# Patient Record
Sex: FEMALE | Race: WHITE | NOT HISPANIC OR LATINO | Employment: FULL TIME | RURAL
[De-identification: names, ages, dates, MRNs, and addresses within clinical notes are randomized per-mention and may not be internally consistent; named-entity substitution may affect disease eponyms.]

---

## 2021-02-05 ENCOUNTER — HISTORICAL (OUTPATIENT)
Dept: ADMINISTRATIVE | Facility: HOSPITAL | Age: 36
End: 2021-02-05

## 2021-02-25 ENCOUNTER — HISTORICAL (OUTPATIENT)
Dept: ADMINISTRATIVE | Facility: HOSPITAL | Age: 36
End: 2021-02-25

## 2021-04-28 ENCOUNTER — HISTORICAL (OUTPATIENT)
Dept: ADMINISTRATIVE | Facility: HOSPITAL | Age: 36
End: 2021-04-28

## 2021-10-11 ENCOUNTER — OFFICE VISIT (OUTPATIENT)
Dept: FAMILY MEDICINE | Facility: CLINIC | Age: 36
End: 2021-10-11
Payer: COMMERCIAL

## 2021-10-11 VITALS
WEIGHT: 170 LBS | HEART RATE: 83 BPM | HEIGHT: 64 IN | SYSTOLIC BLOOD PRESSURE: 108 MMHG | RESPIRATION RATE: 16 BRPM | DIASTOLIC BLOOD PRESSURE: 67 MMHG | BODY MASS INDEX: 29.02 KG/M2 | TEMPERATURE: 98 F | OXYGEN SATURATION: 97 %

## 2021-10-11 DIAGNOSIS — Z20.822 CONTACT WITH AND (SUSPECTED) EXPOSURE TO COVID-19: ICD-10-CM

## 2021-10-11 DIAGNOSIS — R05.9 COUGH: ICD-10-CM

## 2021-10-11 DIAGNOSIS — J01.90 ACUTE NON-RECURRENT SINUSITIS, UNSPECIFIED LOCATION: Primary | ICD-10-CM

## 2021-10-11 LAB
CTP QC/QA: YES
FLUAV AG NPH QL: NEGATIVE
FLUBV AG NPH QL: NEGATIVE
SARS-COV-2 AG RESP QL IA.RAPID: NEGATIVE

## 2021-10-11 PROCEDURE — 99213 PR OFFICE/OUTPT VISIT, EST, LEVL III, 20-29 MIN: ICD-10-PCS | Mod: ,,, | Performed by: FAMILY MEDICINE

## 2021-10-11 PROCEDURE — 87428 POCT SARS-COV2 (COVID) WITH FLU ANTIGEN: ICD-10-PCS | Mod: QW,,, | Performed by: FAMILY MEDICINE

## 2021-10-11 PROCEDURE — 99213 OFFICE O/P EST LOW 20 MIN: CPT | Mod: ,,, | Performed by: FAMILY MEDICINE

## 2021-10-11 PROCEDURE — 87428 SARSCOV & INF VIR A&B AG IA: CPT | Mod: QW,,, | Performed by: FAMILY MEDICINE

## 2021-10-11 RX ORDER — AZITHROMYCIN 250 MG/1
TABLET, FILM COATED ORAL
Qty: 6 TABLET | Refills: 0 | Status: SHIPPED | OUTPATIENT
Start: 2021-10-11 | End: 2021-11-28

## 2021-11-22 ENCOUNTER — OFFICE VISIT (OUTPATIENT)
Dept: FAMILY MEDICINE | Facility: CLINIC | Age: 36
End: 2021-11-22
Payer: COMMERCIAL

## 2021-11-22 VITALS
TEMPERATURE: 99 F | BODY MASS INDEX: 41.11 KG/M2 | HEART RATE: 67 BPM | HEIGHT: 64 IN | RESPIRATION RATE: 18 BRPM | OXYGEN SATURATION: 100 % | DIASTOLIC BLOOD PRESSURE: 87 MMHG | WEIGHT: 240.81 LBS | SYSTOLIC BLOOD PRESSURE: 131 MMHG

## 2021-11-22 DIAGNOSIS — K21.00 GASTROESOPHAGEAL REFLUX DISEASE WITH ESOPHAGITIS WITHOUT HEMORRHAGE: Primary | Chronic | ICD-10-CM

## 2021-11-22 PROCEDURE — 99213 PR OFFICE/OUTPT VISIT, EST, LEVL III, 20-29 MIN: ICD-10-PCS | Mod: ,,, | Performed by: FAMILY MEDICINE

## 2021-11-22 PROCEDURE — 99213 OFFICE O/P EST LOW 20 MIN: CPT | Mod: ,,, | Performed by: FAMILY MEDICINE

## 2021-11-22 RX ORDER — PANTOPRAZOLE SODIUM 40 MG/1
40 TABLET, DELAYED RELEASE ORAL 2 TIMES DAILY
Qty: 60 TABLET | Refills: 2 | Status: SHIPPED | OUTPATIENT
Start: 2021-11-22 | End: 2021-11-23 | Stop reason: DRUGHIGH

## 2021-11-23 ENCOUNTER — PATIENT MESSAGE (OUTPATIENT)
Dept: FAMILY MEDICINE | Facility: CLINIC | Age: 36
End: 2021-11-23
Payer: COMMERCIAL

## 2021-11-23 RX ORDER — PANTOPRAZOLE SODIUM 40 MG/1
40 TABLET, DELAYED RELEASE ORAL DAILY
Qty: 30 TABLET | Refills: 2 | Status: SHIPPED | OUTPATIENT
Start: 2021-11-23 | End: 2022-11-23

## 2021-11-28 PROBLEM — K21.00 GASTROESOPHAGEAL REFLUX DISEASE WITH ESOPHAGITIS WITHOUT HEMORRHAGE: Chronic | Status: ACTIVE | Noted: 2021-11-28

## 2022-04-01 ENCOUNTER — OFFICE VISIT (OUTPATIENT)
Dept: FAMILY MEDICINE | Facility: CLINIC | Age: 37
End: 2022-04-01
Payer: COMMERCIAL

## 2022-04-01 ENCOUNTER — HOSPITAL ENCOUNTER (OUTPATIENT)
Dept: RADIOLOGY | Facility: HOSPITAL | Age: 37
Discharge: HOME | End: 2022-04-01
Attending: FAMILY MEDICINE
Payer: COMMERCIAL

## 2022-04-01 VITALS
OXYGEN SATURATION: 99 % | SYSTOLIC BLOOD PRESSURE: 113 MMHG | RESPIRATION RATE: 16 BRPM | BODY MASS INDEX: 41.83 KG/M2 | HEART RATE: 76 BPM | DIASTOLIC BLOOD PRESSURE: 79 MMHG | WEIGHT: 245 LBS | TEMPERATURE: 99 F | HEIGHT: 64 IN

## 2022-04-01 DIAGNOSIS — M79.671 RIGHT FOOT PAIN: ICD-10-CM

## 2022-04-01 DIAGNOSIS — M79.671 RIGHT FOOT PAIN: Primary | ICD-10-CM

## 2022-04-01 PROCEDURE — 99213 OFFICE O/P EST LOW 20 MIN: CPT | Mod: ,,, | Performed by: FAMILY MEDICINE

## 2022-04-01 PROCEDURE — 3074F SYST BP LT 130 MM HG: CPT | Mod: CPTII,,, | Performed by: FAMILY MEDICINE

## 2022-04-01 PROCEDURE — 3078F PR MOST RECENT DIASTOLIC BLOOD PRESSURE < 80 MM HG: ICD-10-PCS | Mod: CPTII,,, | Performed by: FAMILY MEDICINE

## 2022-04-01 PROCEDURE — 1159F MED LIST DOCD IN RCRD: CPT | Mod: CPTII,,, | Performed by: FAMILY MEDICINE

## 2022-04-01 PROCEDURE — 99051 MED SERV EVE/WKEND/HOLIDAY: CPT | Mod: ,,, | Performed by: FAMILY MEDICINE

## 2022-04-01 PROCEDURE — 1159F PR MEDICATION LIST DOCUMENTED IN MEDICAL RECORD: ICD-10-PCS | Mod: CPTII,,, | Performed by: FAMILY MEDICINE

## 2022-04-01 PROCEDURE — 3078F DIAST BP <80 MM HG: CPT | Mod: CPTII,,, | Performed by: FAMILY MEDICINE

## 2022-04-01 PROCEDURE — 3008F PR BODY MASS INDEX (BMI) DOCUMENTED: ICD-10-PCS | Mod: CPTII,,, | Performed by: FAMILY MEDICINE

## 2022-04-01 PROCEDURE — 1160F RVW MEDS BY RX/DR IN RCRD: CPT | Mod: CPTII,,, | Performed by: FAMILY MEDICINE

## 2022-04-01 PROCEDURE — 99213 PR OFFICE/OUTPT VISIT, EST, LEVL III, 20-29 MIN: ICD-10-PCS | Mod: ,,, | Performed by: FAMILY MEDICINE

## 2022-04-01 PROCEDURE — 99051 PR MEDICAL SERVICES, EVE/WKEND/HOLIDAY: ICD-10-PCS | Mod: ,,, | Performed by: FAMILY MEDICINE

## 2022-04-01 PROCEDURE — 1160F PR REVIEW ALL MEDS BY PRESCRIBER/CLIN PHARMACIST DOCUMENTED: ICD-10-PCS | Mod: CPTII,,, | Performed by: FAMILY MEDICINE

## 2022-04-01 PROCEDURE — 3074F PR MOST RECENT SYSTOLIC BLOOD PRESSURE < 130 MM HG: ICD-10-PCS | Mod: CPTII,,, | Performed by: FAMILY MEDICINE

## 2022-04-01 PROCEDURE — 3008F BODY MASS INDEX DOCD: CPT | Mod: CPTII,,, | Performed by: FAMILY MEDICINE

## 2022-04-01 PROCEDURE — 73630 X-RAY EXAM OF FOOT: CPT | Mod: TC,RT

## 2022-04-01 RX ORDER — MELOXICAM 7.5 MG/1
7.5 TABLET ORAL DAILY
Qty: 30 TABLET | Refills: 1 | Status: SHIPPED | OUTPATIENT
Start: 2022-04-01 | End: 2024-02-14

## 2022-04-01 RX ORDER — DICLOFENAC SODIUM 10 MG/G
GEL TOPICAL
Qty: 200 G | Refills: 5 | Status: SHIPPED | OUTPATIENT
Start: 2022-04-01 | End: 2022-09-25

## 2022-04-01 NOTE — PROGRESS NOTES
"   Rajeev Valenzuela MD    [address] [place] [address]     PATIENT NAME: Chloe Julien  : 1985  DATE: 22  MRN: [id number]      Billing Provider: Rajeev Valenzuela MD  Level of Service: IA OFFICE/OUTPT VISIT, EST, LEVL III, 20-29 MIN  Patient PCP Information     Provider PCP Type    Edita Ellis MD General          Reason for Visit / Chief Complaint: Foot Pain (Presents with right foot pain for 2 weeks. Reports that the pain is on the anterior and medial side of her foot. Admits that she was dancing when the pain started and she "felt something pop.")       Update PCP  Update Chief Complaint         History of Present Illness / Problem Focused Workflow     Chloe Julien presents to the clinic with Foot Pain (Presents with right foot pain for 2 weeks. Reports that the pain is on the anterior and medial side of her foot. Admits that she was dancing when the pain started and she "felt something pop.")     HPI    Review of Systems     Review of Systems   Constitutional: Positive for activity change. Negative for unexpected weight change.   HENT: Negative for hearing loss, rhinorrhea and trouble swallowing.    Eyes: Negative for discharge and visual disturbance.   Respiratory: Negative for chest tightness and wheezing.    Cardiovascular: Negative for chest pain and palpitations.   Gastrointestinal: Negative for blood in stool, constipation, diarrhea and vomiting.   Endocrine: Negative for polydipsia and polyuria.   Genitourinary: Negative for difficulty urinating, dysuria, hematuria and menstrual problem.   Musculoskeletal: Negative for arthralgias, joint swelling and neck pain.   Neurological: Negative for weakness and headaches.   Psychiatric/Behavioral: Negative for confusion and dysphoric mood.        Medical / Social / Family History     Past Medical History:   Diagnosis Date    Cervical cancer     Emphysema of lung     IBS (irritable bowel " syndrome)        Past Surgical History:   Procedure Laterality Date    ADENOIDECTOMY      HYSTERECTOMY      SPINE SURGERY      TONSILLECTOMY         Social History  Ms.  reports that she has been smoking. She has never used smokeless tobacco. She reports previous alcohol use.    Family History  Ms.'s family history includes Cancer in her maternal grandfather, maternal grandmother, mother, paternal grandfather, and paternal grandmother; Heart disease in her father.    Medications and Allergies     Medications  Outpatient Medications Marked as Taking for the 4/1/22 encounter (Office Visit) with Rajeev Valenzuela MD   Medication Sig Dispense Refill    pantoprazole (PROTONIX) 40 MG tablet Take 1 tablet (40 mg total) by mouth once daily. 30 tablet 2       Allergies  Review of patient's allergies indicates:   Allergen Reactions    Opioids - morphine analogues        Physical Examination     Vitals:    04/01/22 1720   BP: 113/79   Pulse: 76   Resp: 16   Temp: 98.6 °F (37 °C)     Physical Exam  Vitals and nursing note reviewed.   Constitutional:       General: She is not in acute distress.     Appearance: Normal appearance. She is not ill-appearing.   Eyes:      Extraocular Movements: Extraocular movements intact.      Pupils: Pupils are equal, round, and reactive to light.   Cardiovascular:      Rate and Rhythm: Normal rate and regular rhythm.      Heart sounds: Normal heart sounds.   Pulmonary:      Effort: Pulmonary effort is normal.      Breath sounds: Normal breath sounds.   Abdominal:      General: Bowel sounds are normal.      Palpations: Abdomen is soft.   Musculoskeletal:         General: Normal range of motion.        Feet:    Feet:      Comments: Tenderness with flexion and extension of the great toe  Skin:     Findings: No rash.   Neurological:      General: No focal deficit present.      Mental Status: She is alert and oriented to person, place, and time. Mental status is at baseline.   Psychiatric:          Mood and Affect: Mood normal.         Behavior: Behavior normal.          Assessment and Plan (including Health Maintenance)      Problem List  [employer]  Document Outside HM   :    Plan:         Health Maintenance Due   Topic Date Due    Hepatitis C Screening  Never done    Lipid Panel  Never done    HIV Screening  Never done       Problem List Items Addressed This Visit        Orthopedic    Right foot pain - Primary    Relevant Orders    X-Ray Foot Complete 3 view Right        Right foot pain  -     X-Ray Foot Complete 3 view Right; Future; Expected date: 04/01/2022       Health Maintenance Topics with due status: Not Due       Topic Last Completion Date    TETANUS VACCINE 07/16/2014       Procedures     No future appointments.     No follow-ups on file.     Signature:  Rajeev Valenzuela MD  [address] [place] [address]  Phone #: [phone]  Fax #: [phone]    Date of encounter: 4/1/22    There are no Patient Instructions on file for this visit.

## 2022-07-18 ENCOUNTER — OFFICE VISIT (OUTPATIENT)
Dept: FAMILY MEDICINE | Facility: CLINIC | Age: 37
End: 2022-07-18
Payer: COMMERCIAL

## 2022-07-18 VITALS
SYSTOLIC BLOOD PRESSURE: 128 MMHG | HEART RATE: 76 BPM | HEIGHT: 64 IN | BODY MASS INDEX: 37.05 KG/M2 | OXYGEN SATURATION: 98 % | RESPIRATION RATE: 18 BRPM | TEMPERATURE: 98 F | DIASTOLIC BLOOD PRESSURE: 89 MMHG | WEIGHT: 217 LBS

## 2022-07-18 DIAGNOSIS — N30.01 ACUTE CYSTITIS WITH HEMATURIA: Primary | ICD-10-CM

## 2022-07-18 DIAGNOSIS — R30.0 DYSURIA: ICD-10-CM

## 2022-07-18 LAB
BILIRUB SERPL-MCNC: ABNORMAL MG/DL
BLOOD URINE, POC: ABNORMAL
COLOR, POC UA: YELLOW
GLUCOSE UR QL STRIP: ABNORMAL
KETONES UR QL STRIP: ABNORMAL
LEUKOCYTE ESTERASE URINE, POC: ABNORMAL
NITRITE, POC UA: ABNORMAL
PH, POC UA: 7
PROTEIN, POC: ABNORMAL
SPECIFIC GRAVITY, POC UA: 1.02
UROBILINOGEN, POC UA: 1

## 2022-07-18 PROCEDURE — 99213 OFFICE O/P EST LOW 20 MIN: CPT | Mod: 25,,, | Performed by: FAMILY MEDICINE

## 2022-07-18 PROCEDURE — 96372 PR INJECTION,THERAP/PROPH/DIAG2ST, IM OR SUBCUT: ICD-10-PCS | Mod: ,,, | Performed by: FAMILY MEDICINE

## 2022-07-18 PROCEDURE — 3074F PR MOST RECENT SYSTOLIC BLOOD PRESSURE < 130 MM HG: ICD-10-PCS | Mod: CPTII,,, | Performed by: FAMILY MEDICINE

## 2022-07-18 PROCEDURE — 81003 URINALYSIS AUTO W/O SCOPE: CPT | Mod: QW,,, | Performed by: FAMILY MEDICINE

## 2022-07-18 PROCEDURE — 3044F PR MOST RECENT HEMOGLOBIN A1C LEVEL <7.0%: ICD-10-PCS | Mod: CPTII,,, | Performed by: FAMILY MEDICINE

## 2022-07-18 PROCEDURE — 3079F PR MOST RECENT DIASTOLIC BLOOD PRESSURE 80-89 MM HG: ICD-10-PCS | Mod: CPTII,,, | Performed by: FAMILY MEDICINE

## 2022-07-18 PROCEDURE — 3008F BODY MASS INDEX DOCD: CPT | Mod: CPTII,,, | Performed by: FAMILY MEDICINE

## 2022-07-18 PROCEDURE — 3079F DIAST BP 80-89 MM HG: CPT | Mod: CPTII,,, | Performed by: FAMILY MEDICINE

## 2022-07-18 PROCEDURE — 1159F PR MEDICATION LIST DOCUMENTED IN MEDICAL RECORD: ICD-10-PCS | Mod: CPTII,,, | Performed by: FAMILY MEDICINE

## 2022-07-18 PROCEDURE — 81003 POCT URINALYSIS W/O SCOPE: ICD-10-PCS | Mod: QW,,, | Performed by: FAMILY MEDICINE

## 2022-07-18 PROCEDURE — 3008F PR BODY MASS INDEX (BMI) DOCUMENTED: ICD-10-PCS | Mod: CPTII,,, | Performed by: FAMILY MEDICINE

## 2022-07-18 PROCEDURE — 3074F SYST BP LT 130 MM HG: CPT | Mod: CPTII,,, | Performed by: FAMILY MEDICINE

## 2022-07-18 PROCEDURE — 1160F RVW MEDS BY RX/DR IN RCRD: CPT | Mod: CPTII,,, | Performed by: FAMILY MEDICINE

## 2022-07-18 PROCEDURE — 1159F MED LIST DOCD IN RCRD: CPT | Mod: CPTII,,, | Performed by: FAMILY MEDICINE

## 2022-07-18 PROCEDURE — 96372 THER/PROPH/DIAG INJ SC/IM: CPT | Mod: ,,, | Performed by: FAMILY MEDICINE

## 2022-07-18 PROCEDURE — 3044F HG A1C LEVEL LT 7.0%: CPT | Mod: CPTII,,, | Performed by: FAMILY MEDICINE

## 2022-07-18 PROCEDURE — 1160F PR REVIEW ALL MEDS BY PRESCRIBER/CLIN PHARMACIST DOCUMENTED: ICD-10-PCS | Mod: CPTII,,, | Performed by: FAMILY MEDICINE

## 2022-07-18 PROCEDURE — 99213 PR OFFICE/OUTPT VISIT, EST, LEVL III, 20-29 MIN: ICD-10-PCS | Mod: 25,,, | Performed by: FAMILY MEDICINE

## 2022-07-18 RX ORDER — ERGOCALCIFEROL 1.25 MG/1
50000 CAPSULE ORAL
COMMUNITY
Start: 2022-07-06 | End: 2023-10-02

## 2022-07-18 RX ORDER — CEFTRIAXONE 1 G/1
1 INJECTION, POWDER, FOR SOLUTION INTRAMUSCULAR; INTRAVENOUS
Status: COMPLETED | OUTPATIENT
Start: 2022-07-18 | End: 2022-07-18

## 2022-07-18 RX ORDER — SEMAGLUTIDE 1.34 MG/ML
0.5 INJECTION, SOLUTION SUBCUTANEOUS
COMMUNITY
Start: 2022-07-06 | End: 2022-09-25

## 2022-07-18 RX ORDER — CIPROFLOXACIN 500 MG/1
500 TABLET ORAL EVERY 12 HOURS
Qty: 14 TABLET | Refills: 0 | Status: SHIPPED | OUTPATIENT
Start: 2022-07-18 | End: 2022-07-25

## 2022-07-18 RX ADMIN — CEFTRIAXONE 1 G: 1 INJECTION, POWDER, FOR SOLUTION INTRAMUSCULAR; INTRAVENOUS at 04:07

## 2022-07-18 NOTE — PROGRESS NOTES
Subjective:       Patient ID: Chloe Julien is a 37 y.o. female.    Chief Complaint: Urinary Tract Infection (Dysuria, lower back pain, lower abdominal discomfort started Saturday.)    HPI  Review of Systems   Constitutional: Negative for activity change, appetite change, chills, diaphoresis, fatigue, fever and unexpected weight change.   HENT: Negative for nasal congestion, dental problem, drooling, ear discharge, ear pain, facial swelling, hearing loss, mouth sores, nosebleeds, postnasal drip, rhinorrhea, sinus pressure/congestion, sneezing, sore throat, tinnitus, trouble swallowing, voice change and goiter.    Eyes: Negative for photophobia, discharge, itching and visual disturbance.   Respiratory: Negative for apnea, cough, choking, chest tightness, shortness of breath, wheezing and stridor.    Cardiovascular: Negative for chest pain, palpitations, leg swelling and claudication.   Gastrointestinal: Negative for abdominal distention, abdominal pain, anal bleeding, blood in stool, change in bowel habit, constipation, diarrhea, nausea, vomiting and change in bowel habit.   Endocrine: Negative for cold intolerance, heat intolerance, polydipsia, polyphagia and polyuria.   Genitourinary: Positive for dysuria and frequency. Negative for bladder incontinence, decreased urine volume, difficulty urinating, enuresis, flank pain, hematuria, nocturia, pelvic pain and urgency.   Musculoskeletal: Negative for arthralgias, back pain, gait problem, joint swelling, leg pain, myalgias, neck pain, neck stiffness and joint deformity.   Integumentary:  Negative for pallor, rash, wound, breast mass and breast tenderness.   Allergic/Immunologic: Negative for environmental allergies, food allergies and immunocompromised state.   Neurological: Negative for dizziness, vertigo, tremors, seizures, syncope, facial asymmetry, speech difficulty, weakness, light-headedness, numbness, headaches, disturbances in coordination, memory loss and  coordination difficulties.   Hematological: Negative for adenopathy. Does not bruise/bleed easily.   Psychiatric/Behavioral: Negative for agitation, behavioral problems, confusion, decreased concentration, dysphoric mood, hallucinations, self-injury, sleep disturbance and suicidal ideas. The patient is not nervous/anxious and is not hyperactive.    Breast: Negative for mass and tenderness        Objective:      Physical Exam  Vitals reviewed.   Constitutional:       Appearance: Normal appearance.   HENT:      Head: Normocephalic and atraumatic.      Right Ear: Tympanic membrane, ear canal and external ear normal.      Left Ear: Tympanic membrane, ear canal and external ear normal.      Nose: Nose normal.      Mouth/Throat:      Mouth: Mucous membranes are moist.      Pharynx: Oropharynx is clear.   Eyes:      Extraocular Movements: Extraocular movements intact.      Conjunctiva/sclera: Conjunctivae normal.      Pupils: Pupils are equal, round, and reactive to light.   Cardiovascular:      Rate and Rhythm: Normal rate and regular rhythm.      Pulses: Normal pulses.      Heart sounds: Normal heart sounds.   Pulmonary:      Effort: Pulmonary effort is normal.      Breath sounds: Normal breath sounds.   Abdominal:      General: Bowel sounds are normal.      Palpations: Abdomen is soft.   Musculoskeletal:         General: Normal range of motion.      Cervical back: Normal range of motion and neck supple.   Skin:     General: Skin is warm and dry.   Neurological:      General: No focal deficit present.      Mental Status: She is alert. Mental status is at baseline.   Psychiatric:         Mood and Affect: Mood normal.         Behavior: Behavior normal.         Thought Content: Thought content normal.         Judgment: Judgment normal.         Assessment:       1. Acute cystitis with hematuria    2. Dysuria        Plan:     Acute cystitis with hematuria  -     cefTRIAXone injection 1 g  -     ciprofloxacin HCl (CIPRO) 500  MG tablet; Take 1 tablet (500 mg total) by mouth every 12 (twelve) hours. for 7 days  Dispense: 14 tablet; Refill: 0    Dysuria  -     POCT URINALYSIS W/O SCOPE

## 2022-07-18 NOTE — LETTER
July 18, 2022      [facility]  [address] [address]  Phone: [phone]  Fax: [phone]       Patient: Chloe Julien   YOB: 1985  Date of Visit: 07/18/2022    To Whom It May Concern:    Jose R Julien  was at [facility] on 07/18/2022. The patient may return to work on 07/19/2022 with no restrictions. If you have any questions or concerns, or if I can be of further assistance, please do not hesitate to contact me.    Sincerely,    Shade Escobedo LPN

## 2022-09-13 ENCOUNTER — OFFICE VISIT (OUTPATIENT)
Dept: FAMILY MEDICINE | Facility: CLINIC | Age: 37
End: 2022-09-13
Payer: COMMERCIAL

## 2022-09-13 ENCOUNTER — PATIENT MESSAGE (OUTPATIENT)
Dept: FAMILY MEDICINE | Facility: CLINIC | Age: 37
End: 2022-09-13
Payer: COMMERCIAL

## 2022-09-13 VITALS
TEMPERATURE: 98 F | RESPIRATION RATE: 20 BRPM | BODY MASS INDEX: 34.83 KG/M2 | WEIGHT: 204 LBS | SYSTOLIC BLOOD PRESSURE: 120 MMHG | HEIGHT: 64 IN | DIASTOLIC BLOOD PRESSURE: 85 MMHG | OXYGEN SATURATION: 98 % | HEART RATE: 78 BPM

## 2022-09-13 DIAGNOSIS — L03.221 CELLULITIS AND ABSCESS OF NECK: Primary | ICD-10-CM

## 2022-09-13 DIAGNOSIS — L02.11 CELLULITIS AND ABSCESS OF NECK: Primary | ICD-10-CM

## 2022-09-13 PROCEDURE — 1159F MED LIST DOCD IN RCRD: CPT | Mod: ,,, | Performed by: FAMILY MEDICINE

## 2022-09-13 PROCEDURE — 3079F PR MOST RECENT DIASTOLIC BLOOD PRESSURE 80-89 MM HG: ICD-10-PCS | Mod: ,,, | Performed by: FAMILY MEDICINE

## 2022-09-13 PROCEDURE — 87070 CULTURE, WOUND: ICD-10-PCS | Mod: ,,, | Performed by: CLINICAL MEDICAL LABORATORY

## 2022-09-13 PROCEDURE — 3008F PR BODY MASS INDEX (BMI) DOCUMENTED: ICD-10-PCS | Mod: ,,, | Performed by: FAMILY MEDICINE

## 2022-09-13 PROCEDURE — 1160F RVW MEDS BY RX/DR IN RCRD: CPT | Mod: ,,, | Performed by: FAMILY MEDICINE

## 2022-09-13 PROCEDURE — 3044F PR MOST RECENT HEMOGLOBIN A1C LEVEL <7.0%: ICD-10-PCS | Mod: ,,, | Performed by: FAMILY MEDICINE

## 2022-09-13 PROCEDURE — 1159F PR MEDICATION LIST DOCUMENTED IN MEDICAL RECORD: ICD-10-PCS | Mod: ,,, | Performed by: FAMILY MEDICINE

## 2022-09-13 PROCEDURE — 99213 PR OFFICE/OUTPT VISIT, EST, LEVL III, 20-29 MIN: ICD-10-PCS | Mod: 25,,, | Performed by: FAMILY MEDICINE

## 2022-09-13 PROCEDURE — 87070 CULTURE OTHR SPECIMN AEROBIC: CPT | Mod: ,,, | Performed by: CLINICAL MEDICAL LABORATORY

## 2022-09-13 PROCEDURE — 3044F HG A1C LEVEL LT 7.0%: CPT | Mod: ,,, | Performed by: FAMILY MEDICINE

## 2022-09-13 PROCEDURE — 3079F DIAST BP 80-89 MM HG: CPT | Mod: ,,, | Performed by: FAMILY MEDICINE

## 2022-09-13 PROCEDURE — 99213 OFFICE O/P EST LOW 20 MIN: CPT | Mod: 25,,, | Performed by: FAMILY MEDICINE

## 2022-09-13 PROCEDURE — 3074F PR MOST RECENT SYSTOLIC BLOOD PRESSURE < 130 MM HG: ICD-10-PCS | Mod: ,,, | Performed by: FAMILY MEDICINE

## 2022-09-13 PROCEDURE — 10061 I&D ABSCESS COMP/MULTIPLE: CPT | Mod: ,,, | Performed by: FAMILY MEDICINE

## 2022-09-13 PROCEDURE — 10061 PR DRAIN SKIN ABSCESS COMPLIC: ICD-10-PCS | Mod: ,,, | Performed by: FAMILY MEDICINE

## 2022-09-13 PROCEDURE — 3008F BODY MASS INDEX DOCD: CPT | Mod: ,,, | Performed by: FAMILY MEDICINE

## 2022-09-13 PROCEDURE — 3074F SYST BP LT 130 MM HG: CPT | Mod: ,,, | Performed by: FAMILY MEDICINE

## 2022-09-13 PROCEDURE — 1160F PR REVIEW ALL MEDS BY PRESCRIBER/CLIN PHARMACIST DOCUMENTED: ICD-10-PCS | Mod: ,,, | Performed by: FAMILY MEDICINE

## 2022-09-13 NOTE — LETTER
September 13, 2022    Chloe Julien  [address] [place] [address]         [facility] [address] [place] [address]  Phone: [phone]  Fax: [phone] September 13, 2022     Patient: Chloe Julien   YOB: 1985   Date of Visit: 9/13/2022       To Whom It May Concern:    It is my medical opinion that Chloe Julien may return to work on 09/14/2022.    If you have any questions or concerns, please don't hesitate to call.    Sincerely,        Edita Ellis MD

## 2022-09-13 NOTE — PROGRESS NOTES
Edita Ellis MD    [address] [place] [address]     PATIENT NAME: Chloe Julien  : 1985  DATE: 22  MRN: [id number]      Billing Provider: Edita Ellis MD  Level of Service:   Patient PCP Information       Provider PCP Type    Edita Ellis MD General            Reason for Visit / Chief Complaint: Pain (C/o painful  red area next to left ear)       Update PCP  Update Chief Complaint         History of Present Illness / Problem Focused Workflow     Chloe Julien presents to the clinic with Pain (C/o painful  red area next to left ear)     Patient complains of an abscess to near left ear for several days. She denies fever or drainage.     Pain  Pertinent negatives include no abdominal pain, arthralgias, change in bowel habit, chest pain, chills, congestion, coughing, fatigue, fever, headaches, joint swelling, myalgias, nausea, neck pain, rash, sore throat, vomiting or weakness.     Review of Systems     Review of Systems   Constitutional:  Negative for activity change, appetite change, chills, fatigue, fever and unexpected weight change.   HENT:  Negative for nasal congestion, ear pain, hearing loss, postnasal drip, rhinorrhea, sore throat and trouble swallowing.    Eyes:  Negative for discharge and visual disturbance.   Respiratory:  Negative for cough, chest tightness, shortness of breath and wheezing.    Cardiovascular:  Negative for chest pain, palpitations, leg swelling and claudication.   Gastrointestinal:  Negative for abdominal pain, blood in stool, change in bowel habit, constipation, diarrhea, nausea, vomiting and change in bowel habit.   Endocrine: Negative for polydipsia and polyuria.   Genitourinary:  Negative for difficulty urinating, dysuria, hematuria and menstrual problem.   Musculoskeletal:  Negative for arthralgias, back pain, gait problem, joint swelling, myalgias and neck pain.   Integumentary:  Negative for rash.    Neurological:  Negative for weakness and headaches.   Psychiatric/Behavioral:  Negative for confusion, dysphoric mood and suicidal ideas. The patient is not nervous/anxious.       Medical / Social / Family History     Past Medical History:   Diagnosis Date    Cervical cancer     Emphysema of lung     IBS (irritable bowel syndrome)        Past Surgical History:   Procedure Laterality Date    ADENOIDECTOMY      HYSTERECTOMY      SPINE SURGERY      TONSILLECTOMY         Social History  Ms.  reports that she quit smoking about 4 weeks ago. Her smoking use included cigarettes. She has never used smokeless tobacco. She reports that she does not currently use alcohol. She reports that she does not use drugs.    Family History  Ms.'s family history includes Cancer in her maternal grandfather, maternal grandmother, mother, paternal grandfather, and paternal grandmother; Heart disease in her father.    Medications and Allergies     Medications  Outpatient Medications Marked as Taking for the 9/13/22 encounter (Office Visit) with Edita Ellis MD   Medication Sig Dispense Refill    ergocalciferol (ERGOCALCIFEROL) 50,000 unit Cap Take 50,000 Units by mouth every 7 days.      meloxicam (MOBIC) 7.5 MG tablet Take 1 tablet (7.5 mg total) by mouth once daily. For PAIN and INFLAMMATION 30 tablet 1    OZEMPIC 0.25 mg or 0.5 mg(2 mg/1.5 mL) pen injector Inject 0.5 mg into the skin every 7 days. Pt takes 1 mg every week      pantoprazole (PROTONIX) 40 MG tablet Take 1 tablet (40 mg total) by mouth once daily. 30 tablet 2       Allergies  Review of patient's allergies indicates:   Allergen Reactions    Opioids - morphine analogues        Physical Examination     Vitals:    09/13/22 1048   BP: 120/85   Pulse: 78   Resp: 20   Temp: 98.1 °F (36.7 °C)     Physical Exam  Vitals and nursing note reviewed.   Constitutional:       General: She is not in acute distress.     Appearance: Normal appearance. She is not ill-appearing.   Eyes:       Pupils: Pupils are equal, round, and reactive to light.   Cardiovascular:      Rate and Rhythm: Normal rate and regular rhythm.      Heart sounds: Normal heart sounds.   Pulmonary:      Effort: Pulmonary effort is normal.      Breath sounds: Normal breath sounds.   Skin:     Findings: Abscess present.      Comments: Patient has 1 cm area of erythema, induration and fluctuation under left ear.    Neurological:      General: No focal deficit present.      Mental Status: She is alert and oriented to person, place, and time. Mental status is at baseline.   Psychiatric:         Mood and Affect: Mood normal.         Behavior: Behavior normal.        Assessment and Plan (including Health Maintenance)      Problem List  [employer]  Document Outside HM   :    Plan:         Health Maintenance Due   Topic Date Due    Hepatitis C Screening  Never done    HIV Screening  Never done    Influenza Vaccine (1) 09/01/2022       Problem List Items Addressed This Visit    None  Visit Diagnoses       Cellulitis and abscess of neck    -  Primary    Relevant Medications    sulfamethoxazole-trimethoprim 800-160mg (BACTRIM DS) 800-160 mg Tab    Other Relevant Orders    Culture, Wound (Completed)          Cellulitis and abscess of neck  -     Culture, Wound  -     sulfamethoxazole-trimethoprim 800-160mg (BACTRIM DS) 800-160 mg Tab; Take 1 tablet by mouth 2 (two) times daily.  Dispense: 20 tablet; Refill: 0    Other orders  -     Incision & Drainage     Health Maintenance Topics with due status: Not Due       Topic Last Completion Date    TETANUS VACCINE 07/16/2014       Incision & Drainage    Date/Time: 9/13/2022 10:30 AM  Performed by: Edita Ellis MD  Authorized by: Edita Ellis MD       Type:  Abscess  Body area:  Head/neck  Anesthesia:  Local infiltration  Local anesthetic: Lidocaine 1% without epinephrine  Scalpel size:  11  Incision type:  Single straight  Complexity:  Simple  Drainage:  Bloody and purulent  Drainage  amount:  Moderate  Wound treatment:  Drainage, incision, expression of material and wound packed  Packing material:  1/4 in iodoform gauze  Patient tolerance:  Patient tolerated the procedure well with no immediate complications     No future appointments.     Follow up if symptoms worsen or fail to improve.     Signature:  Edita Ellis MD  [address] [place] [address]  Phone #: [phone]  Fax #: [phone]    Date of encounter: 9/13/22    There are no Patient Instructions on file for this visit.

## 2022-09-14 RX ORDER — SULFAMETHOXAZOLE AND TRIMETHOPRIM 800; 160 MG/1; MG/1
1 TABLET ORAL 2 TIMES DAILY
Qty: 20 TABLET | Refills: 0 | Status: SHIPPED | OUTPATIENT
Start: 2022-09-14 | End: 2023-01-04

## 2022-09-15 LAB — MICROORGANISM SPEC CULT: NORMAL

## 2023-01-04 ENCOUNTER — OFFICE VISIT (OUTPATIENT)
Dept: FAMILY MEDICINE | Facility: CLINIC | Age: 38
End: 2023-01-04

## 2023-01-04 VITALS
HEART RATE: 87 BPM | HEIGHT: 64 IN | SYSTOLIC BLOOD PRESSURE: 132 MMHG | TEMPERATURE: 100 F | RESPIRATION RATE: 18 BRPM | OXYGEN SATURATION: 99 % | BODY MASS INDEX: 32.44 KG/M2 | WEIGHT: 190 LBS | DIASTOLIC BLOOD PRESSURE: 82 MMHG

## 2023-01-04 DIAGNOSIS — U07.1 COVID-19 VIRUS INFECTION: Primary | ICD-10-CM

## 2023-01-04 DIAGNOSIS — R50.9 FEVER, UNSPECIFIED FEVER CAUSE: ICD-10-CM

## 2023-01-04 PROBLEM — C53.1 MALIGNANT NEOPLASM OF EXOCERVIX: Status: ACTIVE | Noted: 2023-01-04

## 2023-01-04 PROBLEM — M79.671 RIGHT FOOT PAIN: Status: RESOLVED | Noted: 2022-04-01 | Resolved: 2023-01-04

## 2023-01-04 LAB
CTP QC/QA: YES
FLUAV AG NPH QL: NEGATIVE
FLUBV AG NPH QL: NEGATIVE
SARS-COV-2 AG RESP QL IA.RAPID: POSITIVE

## 2023-01-04 PROCEDURE — 99051 PR MEDICAL SERVICES, EVE/WKEND/HOLIDAY: ICD-10-PCS | Mod: ,,, | Performed by: NURSE PRACTITIONER

## 2023-01-04 PROCEDURE — 99051 MED SERV EVE/WKEND/HOLIDAY: CPT | Mod: ,,, | Performed by: NURSE PRACTITIONER

## 2023-01-04 PROCEDURE — 99214 PR OFFICE/OUTPT VISIT, EST, LEVL IV, 30-39 MIN: ICD-10-PCS | Mod: ,,, | Performed by: NURSE PRACTITIONER

## 2023-01-04 PROCEDURE — 99214 OFFICE O/P EST MOD 30 MIN: CPT | Mod: ,,, | Performed by: NURSE PRACTITIONER

## 2023-01-04 PROCEDURE — 87428 SARSCOV & INF VIR A&B AG IA: CPT | Mod: QW,,, | Performed by: NURSE PRACTITIONER

## 2023-01-04 PROCEDURE — 87428 POCT SARS-COV2 (COVID) WITH FLU ANTIGEN: ICD-10-PCS | Mod: QW,,, | Performed by: NURSE PRACTITIONER

## 2023-01-04 RX ORDER — AZITHROMYCIN 250 MG/1
TABLET, FILM COATED ORAL
Qty: 6 TABLET | Refills: 0 | Status: SHIPPED | OUTPATIENT
Start: 2023-01-04 | End: 2023-10-02

## 2023-01-04 NOTE — LETTER
[address] [facility] [address]  Phone: [phone]  Fax: [phone]          Return to Work/School    Patient: Chloe Julien  YOB: 1985   Date: 01/04/2023     To Whom It May Concern:     Chloe Julien was in contact with/seen in my office on 01/04/2023. COVID-19 is present in our communities across the [facility]. There is limited testing for COVID at this time, so not all patients can be tested. In this situation, your employee meets the following criteria:     Chloe Julien has met the criteria for COVID-19 testing and has a POSITIVE result. She can return to work once they are asymptomatic for 24 hours without the use of fever reducing medications AND at least five days from the start of symptoms (or from the first positive result if they have no symptoms).      If you have any questions or concerns, or if I can be of further assistance, please do not hesitate to contact me.     Sincerely,    ELIZA Kate

## 2023-01-05 NOTE — PROGRESS NOTES
[facility]    Chief Complaint      Chief Complaint   Patient presents with    Fever     States ibuprofen one and half hour ago     Chills    Headache    Generalized Body Aches    Fatigue    Sore Throat    Cough    Sinus Problem     With post nasal drainage noted     Otalgia     Left ear discomfort with fullness sensation noted symptoms present about 2 days with OTC medications in use      History of Present Illness      Chloe Julien is a 37 y.o. female with chronic conditions of IBS who presents today for c/o URI symptoms that started yesterday.    URI   This is a new problem. The current episode started yesterday. The problem has been gradually worsening. Associated symptoms include congestion, coughing, ear pain, headaches, a plugged ear sensation (left), rhinorrhea, sinus pain, sneezing and a sore throat. Pertinent negatives include no abdominal pain, chest pain, dysuria, nausea, rash, vomiting or wheezing. She has tried NSAIDs for the symptoms. The treatment provided mild relief.     Past Medical History:  Past Medical History:   Diagnosis Date    Cervical cancer     Emphysema of lung     IBS (irritable bowel syndrome)      Past Surgical History:   has a past surgical history that includes Hysterectomy; Tonsillectomy; Spine surgery; and Adenoidectomy.    Social History:  Social History     Tobacco Use    Smoking status: Former     Types: Cigarettes     Quit date: 2022     Years since quittin.3    Smokeless tobacco: Never   Substance Use Topics    Alcohol use: Not Currently    Drug use: Never     I personally reviewed all past medical, surgical, and social.     Review of Systems   Constitutional:  Positive for chills and malaise/fatigue. Negative for fever.   HENT:  Positive for congestion, ear pain, rhinorrhea, sinus pain, sneezing and sore throat.    Eyes:  Negative for pain, discharge and redness.   Respiratory:  Positive for cough and sputum production. Negative for shortness of breath and  "wheezing.    Cardiovascular:  Negative for chest pain and palpitations.   Gastrointestinal:  Negative for abdominal pain, nausea and vomiting.   Genitourinary:  Negative for dysuria, frequency and urgency.   Musculoskeletal:  Positive for myalgias.   Skin:  Negative for itching and rash.   Neurological:  Positive for headaches.   Endo/Heme/Allergies:  Negative for environmental allergies. Does not bruise/bleed easily.   Psychiatric/Behavioral:  Negative for depression and suicidal ideas.       Medications:  Outpatient Encounter Medications as of 1/4/2023   Medication Sig Dispense Refill    meloxicam (MOBIC) 7.5 MG tablet Take 1 tablet (7.5 mg total) by mouth once daily. For PAIN and INFLAMMATION 30 tablet 1    azithromycin (ZITHROMAX Z-DEMETRIUS) 250 MG tablet Take 2 tablets on day #1, then 1 tablet on days 2-5 6 tablet 0    ergocalciferol (ERGOCALCIFEROL) 50,000 unit Cap Take 50,000 Units by mouth every 7 days.      pantoprazole (PROTONIX) 40 MG tablet Take 1 tablet (40 mg total) by mouth once daily. 30 tablet 2    [DISCONTINUED] sulfamethoxazole-trimethoprim 800-160mg (BACTRIM DS) 800-160 mg Tab Take 1 tablet by mouth 2 (two) times daily. (Patient not taking: Reported on 1/4/2023) 20 tablet 0     No facility-administered encounter medications on file as of 1/4/2023.     Allergies:  Review of patient's allergies indicates:   Allergen Reactions    Opioids - morphine analogues      Health Maintenance:  Immunization History   Administered Date(s) Administered    Influenza 01/15/2021, 11/03/2021    Tdap 07/16/2014      Health Maintenance   Topic Date Due    Hepatitis C Screening  Never done    TETANUS VACCINE  07/16/2024    Lipid Panel  Completed      Physical Exam      Vital Signs  Temp: 99.5 °F (37.5 °C)  Pulse: 87  Resp: 18  SpO2: 99 %  BP: 132/82  Pain Score:   6  Pain Loc: Generalized  Height and Weight  Height: 5' 4" (162.6 cm)  Weight: 86.2 kg (190 lb)  BSA (Calculated - sq m): 1.97 sq meters  BMI (Calculated): " 32.6  Weight in (lb) to have BMI = 25: 145.3]    Physical Exam  Vitals and nursing note reviewed.   Constitutional:       General: She is not in acute distress.     Appearance: Normal appearance.   HENT:      Head: Normocephalic and atraumatic.      Right Ear: External ear normal.      Left Ear: External ear normal.      Nose: Nose normal.      Mouth/Throat:      Mouth: Mucous membranes are moist.   Eyes:      Conjunctiva/sclera: Conjunctivae normal.   Cardiovascular:      Rate and Rhythm: Normal rate and regular rhythm.      Heart sounds: Normal heart sounds. No murmur heard.  Pulmonary:      Effort: Pulmonary effort is normal. No respiratory distress.      Breath sounds: Normal breath sounds.   Musculoskeletal:         General: Normal range of motion.   Skin:     General: Skin is warm.   Neurological:      Mental Status: She is alert and oriented to person, place, and time.      Gait: Gait normal.   Psychiatric:         Mood and Affect: Mood normal.         Behavior: Behavior normal.         Thought Content: Thought content normal.         Judgment: Judgment normal.      Laboratory:  CBC:  Recent Labs   Lab 04/04/22  0930   WBC 8.64   RBC 4.71   Hemoglobin 15.3   Hematocrit 45.5   Platelet Count 368   MCV 96.6 H   MCH 32.5 H   MCHC 33.6     CMP:  Recent Labs   Lab 04/04/22  0930   Glucose 99   Calcium 9.6   Albumin 4.2   Total Protein 7.8   Sodium 140   Potassium 3.9   CO2 25   Chloride 106   BUN 18   Alk Phos 99 H   ALT 30   AST 16   Bilirubin, Total 0.8     LIPIDS:  Recent Labs   Lab 04/28/21  0523 04/04/22  0930   TSH 1.710 1.470   HDL Cholesterol  --  55   Cholesterol  --  194   Triglycerides  --  182 H   LDL Calculated  --  103   Cholesterol/HDL Ratio (Risk Factor)  --  3.5   Non-HDL  --  139     TSH:  Recent Labs   Lab 04/28/21  0523 04/04/22  0930   TSH 1.710 1.470     A1C:  Recent Labs   Lab 04/04/22  0930   Hemoglobin A1C 5.1     Assessment/Plan     Chloe Julien is a 37 y.o.female with:    1. Fever,  unspecified fever cause  - POCT SARS-COV2 (COVID) with Flu Antigen    2. COVID-19 virus infection  - azithromycin (ZITHROMAX Z-DEMETRIUS) 250 MG tablet; Take 2 tablets on day #1, then 1 tablet on days 2-5  Dispense: 6 tablet; Refill: 0  Your test was POSITIVE for COVID-19 (coronavirus).   -Please isolate yourself at home.  You may leave home and/or return to work once the following conditions are met:  More than 5 days since symptoms first appeared AND  More than 24 hours fever free without medications AND  Symptoms are improving  Continue to wear a mask around others for 5 additional days.  -OTC tylenol/ibuprofen as needed for temperature 100.4 or greater/body aches.  -OTC daily multivitamin with vitamin C and zinc.    Chronic conditions status updated as per HPI.  Other than changes above, cont current medications and maintain follow up with specialists.  Return to clinic as needed.     Jeny Davis, MARY JANEP  [facility]

## 2023-01-05 NOTE — PATIENT INSTRUCTIONS
Your test was POSITIVE for COVID-19 (coronavirus).   -Please isolate yourself at home.  You may leave home and/or return to work once the following conditions are met:  More than 5 days since symptoms first appeared AND  More than 24 hours fever free without medications AND  Symptoms are improving  Continue to wear a mask around others for 5 additional days.  -OTC tylenol/ibuprofen as needed for temperature 100.4 or greater/body aches.  -OTC daily multivitamin with vitamin C and zinc.

## 2023-10-02 ENCOUNTER — OFFICE VISIT (OUTPATIENT)
Dept: FAMILY MEDICINE | Facility: CLINIC | Age: 38
End: 2023-10-02
Payer: COMMERCIAL

## 2023-10-02 VITALS
SYSTOLIC BLOOD PRESSURE: 130 MMHG | BODY MASS INDEX: 39.61 KG/M2 | HEIGHT: 64 IN | HEART RATE: 79 BPM | OXYGEN SATURATION: 97 % | RESPIRATION RATE: 20 BRPM | WEIGHT: 232 LBS | DIASTOLIC BLOOD PRESSURE: 82 MMHG

## 2023-10-02 DIAGNOSIS — N39.3 PRIMARY STRESS URINARY INCONTINENCE: ICD-10-CM

## 2023-10-02 DIAGNOSIS — I10 HYPERTENSION, UNSPECIFIED TYPE: ICD-10-CM

## 2023-10-02 DIAGNOSIS — R30.0 DYSURIA: ICD-10-CM

## 2023-10-02 DIAGNOSIS — M25.562 CHRONIC PAIN OF LEFT KNEE: Primary | ICD-10-CM

## 2023-10-02 DIAGNOSIS — G89.29 CHRONIC PAIN OF LEFT KNEE: Primary | ICD-10-CM

## 2023-10-02 LAB
ALBUMIN SERPL BCP-MCNC: 3.7 G/DL (ref 3.5–5)
ALBUMIN/GLOB SERPL: 1 {RATIO}
ALP SERPL-CCNC: 68 U/L (ref 37–98)
ALT SERPL W P-5'-P-CCNC: 21 U/L (ref 13–56)
ANION GAP SERPL CALCULATED.3IONS-SCNC: 8 MMOL/L (ref 7–16)
AST SERPL W P-5'-P-CCNC: 17 U/L (ref 15–37)
BACTERIA #/AREA URNS HPF: ABNORMAL /HPF
BASOPHILS # BLD AUTO: 0.03 K/UL (ref 0–0.2)
BASOPHILS NFR BLD AUTO: 0.4 % (ref 0–1)
BILIRUB SERPL-MCNC: 1.2 MG/DL (ref ?–1.2)
BILIRUB UR QL STRIP: NEGATIVE
BUN SERPL-MCNC: 17 MG/DL (ref 7–18)
BUN/CREAT SERPL: 20 (ref 6–20)
CALCIUM SERPL-MCNC: 9.1 MG/DL (ref 8.5–10.1)
CHLORIDE SERPL-SCNC: 106 MMOL/L (ref 98–107)
CHOLEST SERPL-MCNC: 198 MG/DL (ref 0–200)
CHOLEST/HDLC SERPL: 2.9 {RATIO}
CLARITY UR: ABNORMAL
CO2 SERPL-SCNC: 31 MMOL/L (ref 21–32)
COLOR UR: YELLOW
CREAT SERPL-MCNC: 0.85 MG/DL (ref 0.55–1.02)
DIFFERENTIAL METHOD BLD: ABNORMAL
EGFR (NO RACE VARIABLE) (RUSH/TITUS): 90 ML/MIN/1.73M2
EOSINOPHIL # BLD AUTO: 0.06 K/UL (ref 0–0.5)
EOSINOPHIL NFR BLD AUTO: 0.8 % (ref 1–4)
ERYTHROCYTE [DISTWIDTH] IN BLOOD BY AUTOMATED COUNT: 12.9 % (ref 11.5–14.5)
GLOBULIN SER-MCNC: 3.8 G/DL (ref 2–4)
GLUCOSE SERPL-MCNC: 85 MG/DL (ref 74–106)
GLUCOSE UR STRIP-MCNC: NORMAL MG/DL
HCT VFR BLD AUTO: 41.7 % (ref 38–47)
HDLC SERPL-MCNC: 69 MG/DL (ref 40–60)
HGB BLD-MCNC: 13.9 G/DL (ref 12–16)
IMM GRANULOCYTES # BLD AUTO: 0.03 K/UL (ref 0–0.04)
IMM GRANULOCYTES NFR BLD: 0.4 % (ref 0–0.4)
KETONES UR STRIP-SCNC: NEGATIVE MG/DL
LDLC SERPL CALC-MCNC: 102 MG/DL
LDLC/HDLC SERPL: 1.5 {RATIO}
LEUKOCYTE ESTERASE UR QL STRIP: ABNORMAL
LYMPHOCYTES # BLD AUTO: 2.25 K/UL (ref 1–4.8)
LYMPHOCYTES NFR BLD AUTO: 31.6 % (ref 27–41)
MCH RBC QN AUTO: 33.6 PG (ref 27–31)
MCHC RBC AUTO-ENTMCNC: 33.3 G/DL (ref 32–36)
MCV RBC AUTO: 100.7 FL (ref 80–96)
MONOCYTES # BLD AUTO: 0.39 K/UL (ref 0–0.8)
MONOCYTES NFR BLD AUTO: 5.5 % (ref 2–6)
MPC BLD CALC-MCNC: 10.3 FL (ref 9.4–12.4)
MUCOUS, UA: ABNORMAL /LPF
NEUTROPHILS # BLD AUTO: 4.36 K/UL (ref 1.8–7.7)
NEUTROPHILS NFR BLD AUTO: 61.3 % (ref 53–65)
NITRITE UR QL STRIP: POSITIVE
NONHDLC SERPL-MCNC: 129 MG/DL
NRBC # BLD AUTO: 0 X10E3/UL
NRBC, AUTO (.00): 0 %
PH UR STRIP: 7 PH UNITS
PLATELET # BLD AUTO: 312 K/UL (ref 150–400)
POTASSIUM SERPL-SCNC: 4.2 MMOL/L (ref 3.5–5.1)
PROT SERPL-MCNC: 7.5 G/DL (ref 6.4–8.2)
PROT UR QL STRIP: 20
RBC # BLD AUTO: 4.14 M/UL (ref 4.2–5.4)
RBC # UR STRIP: NEGATIVE /UL
RBC #/AREA URNS HPF: 1 /HPF
SODIUM SERPL-SCNC: 141 MMOL/L (ref 136–145)
SP GR UR STRIP: 1.03
SQUAMOUS #/AREA URNS LPF: ABNORMAL /HPF
TRIGL SERPL-MCNC: 137 MG/DL (ref 35–150)
UROBILINOGEN UR STRIP-ACNC: NORMAL MG/DL
VLDLC SERPL-MCNC: 27 MG/DL
WBC # BLD AUTO: 7.12 K/UL (ref 4.5–11)
WBC #/AREA URNS HPF: 46 /HPF

## 2023-10-02 PROCEDURE — 3008F BODY MASS INDEX DOCD: CPT | Mod: CPTII,,, | Performed by: FAMILY MEDICINE

## 2023-10-02 PROCEDURE — 99214 OFFICE O/P EST MOD 30 MIN: CPT | Mod: ,,, | Performed by: FAMILY MEDICINE

## 2023-10-02 PROCEDURE — 80061 LIPID PANEL: ICD-10-PCS | Mod: ,,, | Performed by: CLINICAL MEDICAL LABORATORY

## 2023-10-02 PROCEDURE — 85025 COMPLETE CBC W/AUTO DIFF WBC: CPT | Mod: ,,, | Performed by: CLINICAL MEDICAL LABORATORY

## 2023-10-02 PROCEDURE — 80061 LIPID PANEL: CPT | Mod: ,,, | Performed by: CLINICAL MEDICAL LABORATORY

## 2023-10-02 PROCEDURE — 87086 CULTURE, URINE: ICD-10-PCS | Mod: ,,, | Performed by: CLINICAL MEDICAL LABORATORY

## 2023-10-02 PROCEDURE — 3079F DIAST BP 80-89 MM HG: CPT | Mod: CPTII,,, | Performed by: FAMILY MEDICINE

## 2023-10-02 PROCEDURE — 80053 COMPREHENSIVE METABOLIC PANEL: ICD-10-PCS | Mod: ,,, | Performed by: CLINICAL MEDICAL LABORATORY

## 2023-10-02 PROCEDURE — 81001 URINALYSIS, REFLEX TO URINE CULTURE: ICD-10-PCS | Mod: ,,, | Performed by: CLINICAL MEDICAL LABORATORY

## 2023-10-02 PROCEDURE — 1160F PR REVIEW ALL MEDS BY PRESCRIBER/CLIN PHARMACIST DOCUMENTED: ICD-10-PCS | Mod: CPTII,,, | Performed by: FAMILY MEDICINE

## 2023-10-02 PROCEDURE — 1160F RVW MEDS BY RX/DR IN RCRD: CPT | Mod: CPTII,,, | Performed by: FAMILY MEDICINE

## 2023-10-02 PROCEDURE — 3075F SYST BP GE 130 - 139MM HG: CPT | Mod: CPTII,,, | Performed by: FAMILY MEDICINE

## 2023-10-02 PROCEDURE — 1159F PR MEDICATION LIST DOCUMENTED IN MEDICAL RECORD: ICD-10-PCS | Mod: CPTII,,, | Performed by: FAMILY MEDICINE

## 2023-10-02 PROCEDURE — 1159F MED LIST DOCD IN RCRD: CPT | Mod: CPTII,,, | Performed by: FAMILY MEDICINE

## 2023-10-02 PROCEDURE — 3079F PR MOST RECENT DIASTOLIC BLOOD PRESSURE 80-89 MM HG: ICD-10-PCS | Mod: CPTII,,, | Performed by: FAMILY MEDICINE

## 2023-10-02 PROCEDURE — 3008F PR BODY MASS INDEX (BMI) DOCUMENTED: ICD-10-PCS | Mod: CPTII,,, | Performed by: FAMILY MEDICINE

## 2023-10-02 PROCEDURE — 80053 COMPREHEN METABOLIC PANEL: CPT | Mod: ,,, | Performed by: CLINICAL MEDICAL LABORATORY

## 2023-10-02 PROCEDURE — 99214 PR OFFICE/OUTPT VISIT, EST, LEVL IV, 30-39 MIN: ICD-10-PCS | Mod: ,,, | Performed by: FAMILY MEDICINE

## 2023-10-02 PROCEDURE — 3075F PR MOST RECENT SYSTOLIC BLOOD PRESS GE 130-139MM HG: ICD-10-PCS | Mod: CPTII,,, | Performed by: FAMILY MEDICINE

## 2023-10-02 PROCEDURE — 87086 URINE CULTURE/COLONY COUNT: CPT | Mod: ,,, | Performed by: CLINICAL MEDICAL LABORATORY

## 2023-10-02 PROCEDURE — 85025 CBC WITH DIFFERENTIAL: ICD-10-PCS | Mod: ,,, | Performed by: CLINICAL MEDICAL LABORATORY

## 2023-10-02 PROCEDURE — 81001 URINALYSIS AUTO W/SCOPE: CPT | Mod: ,,, | Performed by: CLINICAL MEDICAL LABORATORY

## 2023-10-02 RX ORDER — NITROFURANTOIN 25; 75 MG/1; MG/1
100 CAPSULE ORAL 2 TIMES DAILY
Qty: 10 CAPSULE | Refills: 0 | Status: SHIPPED | OUTPATIENT
Start: 2023-10-02

## 2023-10-02 NOTE — PROGRESS NOTES
Doug Horton MD        PATIENT NAME: Chloe Julien  : 1985  DATE: 10/2/23  MRN: [id number]      Billing Provider: Doug Horton MD  Level of Service: CT OFFICE/OUTPT VISIT, EST, LEVL IV, 30-39 MIN  Patient PCP Information       Provider PCP Type    dEita Ellis MD General            Reason for Visit / Chief Complaint: Urinary Tract Infection (Bladder retention, thinks it may be UTI), Referral (Might need referral for urologist), and Knee Pain (Patient thinks she has arthritis in Left knee )       Update PCP  Update Chief Complaint         History of Present Illness / Problem Focused Workflow     Chloe Julien presents to the clinic with Urinary Tract Infection (Bladder retention, thinks it may be UTI), Referral (Might need referral for urologist), and Knee Pain (Patient thinks she has arthritis in Left knee )     Dysuria and L knee pain.  She does have symptoms of stress urinary incontinence with cough laugh and sneeze.  She states she will make an appointment with her gyn physician.  Left knee pain is chronic it does pop it does not lock or give way.    Urinary Tract Infection   Associated symptoms include frequency. Pertinent negatives include no flank pain, hematuria, nausea, constipation or rash.   Knee Pain       Review of Systems     Review of Systems   Constitutional:  Negative for activity change, appetite change, fever and unexpected weight change.   HENT:  Negative for congestion, rhinorrhea, sinus pressure, sinus pain, sore throat and trouble swallowing.    Eyes:  Negative for photophobia, pain, discharge and visual disturbance.   Respiratory:  Negative for cough, chest tightness, wheezing and stridor.    Cardiovascular:  Negative for chest pain, palpitations and leg swelling.   Gastrointestinal:  Negative for abdominal pain, blood in stool, constipation, diarrhea and nausea.   Endocrine: Negative for polydipsia, polyphagia and polyuria.   Genitourinary:  Positive for difficulty  urinating and frequency. Negative for flank pain and hematuria.   Musculoskeletal:  Positive for arthralgias. Negative for neck pain.   Skin:  Negative for rash.   Allergic/Immunologic: Negative for food allergies.   Neurological:  Negative for dizziness, tremors, seizures, syncope, weakness (global weakness) and headaches.   Psychiatric/Behavioral:  Negative for behavioral problems, confusion, decreased concentration, dysphoric mood and hallucinations. The patient is not nervous/anxious.         Medical / Social / Family History     Past Medical History:   Diagnosis Date    Cervical cancer     Emphysema of lung     IBS (irritable bowel syndrome)        Past Surgical History:   Procedure Laterality Date    ADENOIDECTOMY      HYSTERECTOMY      SPINE SURGERY      TONSILLECTOMY         Social History  Ms.  reports that she quit smoking about 13 months ago. Her smoking use included cigarettes. She has never used smokeless tobacco. She reports that she does not currently use alcohol. She reports that she does not use drugs.    Family History  Ms.'s family history includes Cancer in her maternal grandfather, maternal grandmother, mother, paternal grandfather, and paternal grandmother; Heart disease in her father.    Medications and Allergies     Medications  No outpatient medications have been marked as taking for the 10/2/23 encounter (Office Visit) with Doug Horton MD.       Allergies  Review of patient's allergies indicates:   Allergen Reactions    Opioids - morphine analogues        Physical Examination     Vitals:    10/02/23 1112   BP: 130/82   Pulse: 79   Resp: 20     Physical Exam  Constitutional:       General: She is not in acute distress.     Appearance: Normal appearance.   HENT:      Head: Normocephalic.      Right Ear: Tympanic membrane and ear canal normal.      Left Ear: Tympanic membrane and ear canal normal.      Nose: Nose normal.      Mouth/Throat:      Mouth: Mucous membranes are moist.       Pharynx: No oropharyngeal exudate.   Eyes:      Extraocular Movements: Extraocular movements intact.      Pupils: Pupils are equal, round, and reactive to light.   Cardiovascular:      Rate and Rhythm: Normal rate and regular rhythm.      Heart sounds: No murmur heard.  Pulmonary:      Effort: Pulmonary effort is normal.      Breath sounds: Normal breath sounds. No wheezing.   Abdominal:      General: Abdomen is flat. Bowel sounds are normal.      Palpations: Abdomen is soft.      Hernia: No hernia is present.   Musculoskeletal:         General: Normal range of motion.      Cervical back: Normal range of motion and neck supple.      Right lower leg: No edema.      Left lower leg: No edema.   Lymphadenopathy:      Cervical: No cervical adenopathy.   Skin:     General: Skin is warm and dry.      Coloration: Skin is not jaundiced.      Findings: No lesion.   Neurological:      General: No focal deficit present.      Mental Status: She is alert and oriented to person, place, and time.      Cranial Nerves: No cranial nerve deficit.      Gait: Gait normal.   Psychiatric:         Mood and Affect: Mood normal.         Behavior: Behavior normal.         Judgment: Judgment normal.          Assessment and Plan (including Health Maintenance)      Problem List  Smart Sets  Document Outside HM   :    Plan:     1. Hypertension, unspecified type  The current medical regimen is effective;  continue present plan and medications.  -     Comprehensive Metabolic Panel  -     CBC Auto Differential  -     Lipid Panel    2. Dysuria    -     Urinalysis, Reflex to Urine Culture          Health Maintenance Due   Topic Date Due    Hepatitis C Screening  Never done    COVID-19 Vaccine (1) Never done    Pneumococcal Vaccines (Age 0-64) (1 - PCV) Never done    HIV Screening  Never done    Influenza Vaccine (1) 09/01/2023       Problem List Items Addressed This Visit    None  Visit Diagnoses       Chronic pain of left knee    -  Primary     Relevant Orders    X-Ray Knee 1 or 2 View Left    Ambulatory referral/consult to Orthopedics    Hypertension, unspecified type        Relevant Orders    Comprehensive Metabolic Panel    CBC Auto Differential    Lipid Panel    Dysuria        Relevant Orders    Urinalysis, Reflex to Urine Culture    Primary stress urinary incontinence                Health Maintenance Topics with due status: Not Due       Topic Last Completion Date    TETANUS VACCINE 07/16/2014    Hemoglobin A1c (Diabetic Prevention Screening) 04/04/2022     Will refer to Dr. Baldemar Reese for her chronic knee pain.  No future appointments.     There are no Patient Instructions on file for this visit.  Follow up if symptoms worsen or fail to improve.     Signature:  Doug Horton MD      Date of encounter: 10/2/23

## 2023-10-04 LAB — UA COMPLETE W REFLEX CULTURE PNL UR: NORMAL

## 2023-10-19 ENCOUNTER — OFFICE VISIT (OUTPATIENT)
Dept: ORTHOPEDICS | Facility: CLINIC | Age: 38
End: 2023-10-19
Payer: COMMERCIAL

## 2023-10-19 VITALS — WEIGHT: 232 LBS | HEIGHT: 64 IN | BODY MASS INDEX: 39.61 KG/M2

## 2023-10-19 DIAGNOSIS — G89.29 CHRONIC PAIN OF LEFT KNEE: ICD-10-CM

## 2023-10-19 DIAGNOSIS — M25.562 CHRONIC PAIN OF LEFT KNEE: ICD-10-CM

## 2023-10-19 PROCEDURE — 3008F PR BODY MASS INDEX (BMI) DOCUMENTED: ICD-10-PCS | Mod: CPTII,,, | Performed by: ORTHOPAEDIC SURGERY

## 2023-10-19 PROCEDURE — 99213 OFFICE O/P EST LOW 20 MIN: CPT | Mod: PBBFAC | Performed by: ORTHOPAEDIC SURGERY

## 2023-10-19 PROCEDURE — 99203 OFFICE O/P NEW LOW 30 MIN: CPT | Mod: S$PBB,,, | Performed by: ORTHOPAEDIC SURGERY

## 2023-10-19 PROCEDURE — 3008F BODY MASS INDEX DOCD: CPT | Mod: CPTII,,, | Performed by: ORTHOPAEDIC SURGERY

## 2023-10-19 PROCEDURE — 99203 PR OFFICE/OUTPT VISIT, NEW, LEVL III, 30-44 MIN: ICD-10-PCS | Mod: S$PBB,,, | Performed by: ORTHOPAEDIC SURGERY

## 2023-10-19 PROCEDURE — 1159F PR MEDICATION LIST DOCUMENTED IN MEDICAL RECORD: ICD-10-PCS | Mod: CPTII,,, | Performed by: ORTHOPAEDIC SURGERY

## 2023-10-19 PROCEDURE — 1159F MED LIST DOCD IN RCRD: CPT | Mod: CPTII,,, | Performed by: ORTHOPAEDIC SURGERY

## 2023-10-19 NOTE — PROGRESS NOTES
ASSESSMENT:      ICD-10-CM ICD-9-CM   1. Chronic pain of left knee  M25.562 719.46    G89.29 338.29       PLAN:     Findings and treatment options were discussed with the patient regarding the diagnosis.   All questions were answered regarding Chloe Julien 's painful knee.      Natural history and expected course discussed. Questions answered. Educational materials distributed. Rest, ice, compression, and elevation (RICE) therapy. Reduction in offending activity. Patellar compression sleeve. MRI.    There are no Patient Instructions on file for this visit.  Given the above exam findings, I suspect the patient has a meniscus tear or possibly a chondral injury. I have ordered and reviewed her radiographs today and would like to obtain advanced imaging as this time as I am highly concerned for meniscal, chondral, and/or ligamentous injury in this painful knee.  Typical operative and nonoperative treatment measures were reviewed in great detail today. Risks, benefits, and alternatives as it relates to this potential injury were discussed today.  Plan is to proceed with an MRI to assess for internal derangement. Will follow-up after study to discuss results. Will reconsider treatment options at that time.       IMAGING:   X-Ray Knee 1 or 2 View Left    Result Date: 10/2/2023  EXAMINATION: XR KNEE 1 OR 2 VIEW LEFT CLINICAL HISTORY: Pain in left knee TECHNIQUE: Left knee, AP and lateral views COMPARISON: None. FINDINGS: Degenerative changes are present laterally.  There is no evidence of an effusion or fracture.     Early changes of osteoarthritis are present in the lateral compartment. Place of service: [facility] Electronically signed by: Nicky Newman Date:    10/02/2023 Time:    17:31         CC: Knee pain    38 y.o. Female who presents as a new patient to me for evaluation of  left knee pain.  A month ago she stood up and twisted  her knee causing it to pop. States it is painful to  guillermo.  Occupation: Ortho orthotic Fitter  Pain has been present for a month.  Injury description: see above  Mechanical symptoms, such as clicking, locking, and popping are present.    Swelling and effusions  are present.   The patient does  describe symptoms of knee instability, such as the knee buckling and the knee giving way.   Symptoms are worsened with activity.  Better with rest. Treatment thus far has included rest, activity modifications, and oral medications.    she  has not had formal physical therapy.  she has not had prior injections injections into the knee.   she has not had previous advanced imaging such as MRI.     Here today to discuss diagnosis and treatment options.          REVIEW OF SYSTEMS:   Constitution: Negative. Negative for chills, fever and night sweats.    Hematologic/Lymphatic: Negative for bleeding problem. Does not bruise/bleed easily.   Skin: Negative for dry skin, itching and rash.   Musculoskeletal: Negative for falls. Positive for knee pain and muscle weakness.     All other review of symptoms were reviewed and found to be noncontributory.     PAST MEDICAL HISTORY:   Past Medical History:   Diagnosis Date    Cervical cancer     Emphysema of lung     IBS (irritable bowel syndrome)        PAST SURGICAL HISTORY:   Past Surgical History:   Procedure Laterality Date    ADENOIDECTOMY      HYSTERECTOMY      SPINE SURGERY      TONSILLECTOMY         FAMILY HISTORY:   Family History   Problem Relation Age of Onset    Cancer Mother     Heart disease Father     Cancer Maternal Grandmother     Cancer Maternal Grandfather     Cancer Paternal Grandmother     Cancer Paternal Grandfather        SOCIAL HISTORY:   Social History     Socioeconomic History    Marital status:    Tobacco Use    Smoking status: Former     Current packs/day: 0.00     Types: Cigarettes     Quit date: 2022     Years since quittin.1    Smokeless tobacco: Never   Substance and Sexual Activity    Alcohol use:  Not Currently    Drug use: Never       MEDICATIONS:     Current Outpatient Medications:     meloxicam (MOBIC) 7.5 MG tablet, Take 1 tablet (7.5 mg total) by mouth once daily. For PAIN and INFLAMMATION (Patient not taking: Reported on 10/19/2023), Disp: 30 tablet, Rfl: 1    nitrofurantoin, macrocrystal-monohydrate, (MACROBID) 100 MG capsule, Take 1 capsule (100 mg total) by mouth 2 (two) times daily. (Patient not taking: Reported on 10/19/2023), Disp: 10 capsule, Rfl: 0    pantoprazole (PROTONIX) 40 MG tablet, Take 1 tablet (40 mg total) by mouth once daily., Disp: 30 tablet, Rfl: 2    ALLERGIES:   Review of patient's allergies indicates:   Allergen Reactions    Opioids - morphine analogues         PHYSICAL EXAMINATION:    General    Constitutional: She is oriented to person, place, and time. She appears well-developed and well-nourished.   HENT:   Head: Normocephalic and atraumatic.   Eyes: Pupils are equal, round, and reactive to light.   Neck: Neck supple.   Cardiovascular:  Normal rate, regular rhythm and intact distal pulses.            Pulmonary/Chest: Effort normal. No respiratory distress. She exhibits no tenderness.   Abdominal: Soft. There is no abdominal tenderness.   Neurological: She is alert and oriented to person, place, and time. She has normal reflexes. She displays normal reflexes. No cranial nerve deficit. She exhibits normal muscle tone.   Psychiatric: She has a normal mood and affect. Her behavior is normal. Judgment and thought content normal.           Right Knee Exam   Right knee exam is normal.    Left Knee Exam     Inspection   Deformity: absent    Tenderness   The patient tender to palpation of the medial joint line and lateral joint line.    Crepitus   The patient has crepitus of the patella.    Range of Motion   Extension:  normal   Flexion:  abnormal     Tests   Meniscus   Arnold:  Medial - positive Lateral - positive  Stability   Lachman: normal (-1 to 2mm)   PCL-Posterior Drawer:  normal (0 to 2mm)  MCL - Valgus: normal (0 to 2mm)  LCL - Varus: normal (0 to 2mm)  Posterior Sag Test: negative  Patella   Passive Patellar Tilt: lateral tilt  Patellar Tracking: normal  Patellar Grind: positive  J-Sign: J sign absent    Other   Muscle Tightness: hamstring tightness  Sensation: normal    Muscle Strength   Left Lower Extremity   Hip Abduction: 5/5   Quadriceps:  5/5   Hamstrin/5     Reflexes     Left Side  Quadriceps:  2+        No orders of the defined types were placed in this encounter.      Procedures

## 2023-11-01 ENCOUNTER — HOSPITAL ENCOUNTER (OUTPATIENT)
Dept: RADIOLOGY | Facility: HOSPITAL | Age: 38
Discharge: HOME | End: 2023-11-01
Attending: ORTHOPAEDIC SURGERY
Payer: COMMERCIAL

## 2023-11-01 DIAGNOSIS — M25.562 CHRONIC PAIN OF LEFT KNEE: ICD-10-CM

## 2023-11-01 DIAGNOSIS — G89.29 CHRONIC PAIN OF LEFT KNEE: ICD-10-CM

## 2023-11-01 PROCEDURE — 73721 MRI JNT OF LWR EXTRE W/O DYE: CPT | Mod: 26,LT,, | Performed by: RADIOLOGY

## 2023-11-01 PROCEDURE — 73721 MRI JNT OF LWR EXTRE W/O DYE: CPT | Mod: TC,LT

## 2023-11-01 PROCEDURE — 73721 MRI KNEE WITHOUT CONTRAST LEFT: ICD-10-PCS | Mod: 26,LT,, | Performed by: RADIOLOGY

## 2023-11-13 ENCOUNTER — TELEPHONE (OUTPATIENT)
Dept: ORTHOPEDICS | Facility: CLINIC | Age: 38
End: 2023-11-13
Payer: COMMERCIAL

## 2023-11-13 NOTE — TELEPHONE ENCOUNTER
----- Message from Baldemar Reese MD sent at 11/11/2023  9:11 AM CST -----  Bring back in to discuss

## 2024-01-04 ENCOUNTER — OFFICE VISIT (OUTPATIENT)
Dept: FAMILY MEDICINE | Facility: CLINIC | Age: 39
End: 2024-01-04
Payer: COMMERCIAL

## 2024-01-04 VITALS
SYSTOLIC BLOOD PRESSURE: 129 MMHG | OXYGEN SATURATION: 96 % | TEMPERATURE: 99 F | HEART RATE: 102 BPM | DIASTOLIC BLOOD PRESSURE: 83 MMHG | WEIGHT: 232.38 LBS

## 2024-01-04 DIAGNOSIS — U07.1 COVID-19: Primary | ICD-10-CM

## 2024-01-04 DIAGNOSIS — R05.9 COUGH, UNSPECIFIED TYPE: ICD-10-CM

## 2024-01-04 LAB
CTP QC/QA: YES
FLUAV AG NPH QL: NEGATIVE
FLUBV AG NPH QL: NEGATIVE
S PYO RRNA THROAT QL PROBE: NEGATIVE
SARS-COV-2 AG RESP QL IA.RAPID: POSITIVE

## 2024-01-04 PROCEDURE — 3079F DIAST BP 80-89 MM HG: CPT | Mod: CPTII,,,

## 2024-01-04 PROCEDURE — 87426 SARSCOV CORONAVIRUS AG IA: CPT | Mod: QW,,,

## 2024-01-04 PROCEDURE — 87804 INFLUENZA ASSAY W/OPTIC: CPT | Mod: QW,,,

## 2024-01-04 PROCEDURE — 1159F MED LIST DOCD IN RCRD: CPT | Mod: CPTII,,,

## 2024-01-04 PROCEDURE — 99203 OFFICE O/P NEW LOW 30 MIN: CPT | Mod: ,,,

## 2024-01-04 PROCEDURE — 3074F SYST BP LT 130 MM HG: CPT | Mod: CPTII,,,

## 2024-01-04 PROCEDURE — 87880 STREP A ASSAY W/OPTIC: CPT | Mod: QW,,,

## 2024-01-04 NOTE — ASSESSMENT & PLAN NOTE
Influenza: neg  Rapid Strep: neg  Covid: positive  Spoon of locally sourced honey for cough  Take zyrtec over the counter.  Use saline nose sprays  Cool mist humidifier with distilled water  Warm salt water gargles  Warm tea with honey and lemon  Chloraseptic spray OTC.   Tylenol/ibuprofen for pain/fever greater than 100.4  Increase water intake.   Return to clinic if there is no improvement.

## 2024-01-04 NOTE — LETTER
[address] [place] [address]  Phone: [phone]  Fax: [phone]          Return to Work/School    Patient: Chloe Nickerson  YOB: 1985   Date: 01/04/2024     To Whom It May Concern:     Chloe Nickerson was in contact with/seen in my office on 01/04/2024. COVID-19 is present in our communities across the state. There is limited testing for COVID at this time, so not all patients can be tested. In this situation, your employee meets the following criteria:     Chloe Nickerson has met the criteria for COVID-19 testing and has a POSITIVE result. She can return to work once they are asymptomatic for 24 hours without the use of fever reducing medications AND at least five days from the start of symptoms (or from the first positive result if they have no symptoms).      If you have any questions or concerns, or if I can be of further assistance, please do not hesitate to contact me.     Sincerely,    NENO MCKEON

## 2024-01-04 NOTE — PROGRESS NOTES
PATIENT NAME: Chloe Nickerson  : 1985  DATE: 24  MRN: [id number]      Reason for Visit / Chief Complaint: No chief complaint on file.       Update PCP  Update Chief Complaint         History of Present Illness / Problem Focused Workflow     Chloe Nickerson presents to the clinic with No chief complaint on file.     Presents to clinic with congestion, post nasal drip, sore throat, runny nose, headache, body ache, and cough starting yesterday. .2. Has been taking OTC tylenol/ibuprofen.     Review of Systems     @Review of Systems   Constitutional:  Positive for fever. Negative for chills and fatigue.   HENT:  Positive for nasal congestion, ear pain, postnasal drip, rhinorrhea, sinus pressure/congestion and sore throat. Negative for ear discharge.    Eyes:  Negative for visual disturbance.   Respiratory:  Positive for cough. Negative for shortness of breath.    Genitourinary:  Negative for decreased urine volume.   Musculoskeletal:  Positive for myalgias.   Neurological:  Positive for headaches.     Medical / Social / Family History   No past medical history on file.    No past surgical history on file.    Social History  Ms.      Family History  Ms.'s family history is not on file.    Medications and Allergies     Medications  No outpatient medications have been marked as taking for the 24 encounter (Appointment) with Carmen Pastor FNP-C.       Allergies  Review of patient's allergies indicates:  Not on File    Physical Examination   There were no vitals filed for this visit.  Physical Exam  Vitals and nursing note reviewed.   Constitutional:       Appearance: Normal appearance. She is obese.   HENT:      Head: Normocephalic.      Right Ear: Tympanic membrane, ear canal and external ear normal.      Left Ear: Tympanic membrane, ear canal and external ear normal.      Nose: Congestion and rhinorrhea present. Rhinorrhea is clear.      Right Turbinates: Enlarged.      Left Turbinates:  Enlarged.      Mouth/Throat:      Mouth: Mucous membranes are moist.      Pharynx: Oropharynx is clear. Uvula midline. Posterior oropharyngeal erythema present.   Eyes:      Extraocular Movements: Extraocular movements intact.      Conjunctiva/sclera: Conjunctivae normal.      Pupils: Pupils are equal, round, and reactive to light.   Cardiovascular:      Rate and Rhythm: Normal rate and regular rhythm.      Pulses: Normal pulses.      Heart sounds: Normal heart sounds.   Pulmonary:      Effort: Pulmonary effort is normal.      Breath sounds: Normal breath sounds.   Abdominal:      General: Abdomen is flat. Bowel sounds are normal.      Palpations: Abdomen is soft.   Musculoskeletal:         General: Normal range of motion.      Cervical back: Normal range of motion and neck supple.   Skin:     General: Skin is warm and dry.      Capillary Refill: Capillary refill takes less than 2 seconds.   Neurological:      General: No focal deficit present.      Mental Status: She is alert and oriented to person, place, and time.   Psychiatric:         Mood and Affect: Mood normal.         Behavior: Behavior normal.         Thought Content: Thought content normal.         Judgment: Judgment normal.                  Procedures   Assessment and Plan (including Health Maintenance)      Problem List  Smart Sets  Document Outside HM   :    Plan:   Problem List Items Addressed This Visit    None      The patient has no Health Maintenance topics of status Not Due    Future Appointments   Date Time Provider Department Center   1/4/2024  2:20 PM Carmen Pastor FNP-C [facility]         No follow-ups on file.     Signature:  NENO MCKEON        Date of encounter: 1/4/24

## 2024-02-14 ENCOUNTER — OFFICE VISIT (OUTPATIENT)
Dept: FAMILY MEDICINE | Facility: CLINIC | Age: 39
End: 2024-02-14
Payer: COMMERCIAL

## 2024-02-14 VITALS
RESPIRATION RATE: 18 BRPM | WEIGHT: 235 LBS | SYSTOLIC BLOOD PRESSURE: 124 MMHG | DIASTOLIC BLOOD PRESSURE: 80 MMHG | OXYGEN SATURATION: 100 % | HEART RATE: 74 BPM | TEMPERATURE: 98 F | HEIGHT: 64 IN | BODY MASS INDEX: 40.12 KG/M2

## 2024-02-14 DIAGNOSIS — M54.31 SCIATICA OF RIGHT SIDE: Primary | ICD-10-CM

## 2024-02-14 DIAGNOSIS — C53.1 MALIGNANT NEOPLASM OF EXOCERVIX: ICD-10-CM

## 2024-02-14 DIAGNOSIS — M54.9 DORSALGIA, UNSPECIFIED: ICD-10-CM

## 2024-02-14 DIAGNOSIS — E66.01 CLASS 3 OBESITY: ICD-10-CM

## 2024-02-14 PROCEDURE — 1159F MED LIST DOCD IN RCRD: CPT | Mod: CPTII,,, | Performed by: FAMILY MEDICINE

## 2024-02-14 PROCEDURE — 99213 OFFICE O/P EST LOW 20 MIN: CPT | Mod: 25,,, | Performed by: FAMILY MEDICINE

## 2024-02-14 PROCEDURE — 3008F BODY MASS INDEX DOCD: CPT | Mod: CPTII,,, | Performed by: FAMILY MEDICINE

## 2024-02-14 PROCEDURE — 3079F DIAST BP 80-89 MM HG: CPT | Mod: CPTII,,, | Performed by: FAMILY MEDICINE

## 2024-02-14 PROCEDURE — 96372 THER/PROPH/DIAG INJ SC/IM: CPT | Mod: ,,, | Performed by: FAMILY MEDICINE

## 2024-02-14 PROCEDURE — 3074F SYST BP LT 130 MM HG: CPT | Mod: CPTII,,, | Performed by: FAMILY MEDICINE

## 2024-02-14 RX ORDER — GABAPENTIN 100 MG/1
CAPSULE ORAL
Qty: 90 CAPSULE | Refills: 11 | Status: SHIPPED | OUTPATIENT
Start: 2024-02-14

## 2024-02-14 RX ORDER — NAPROXEN SODIUM 550 MG/1
550 TABLET ORAL 2 TIMES DAILY PRN
Qty: 60 TABLET | Refills: 11 | Status: SHIPPED | OUTPATIENT
Start: 2024-02-14

## 2024-02-14 RX ORDER — TIZANIDINE 4 MG/1
TABLET ORAL
Qty: 60 TABLET | Refills: 11 | Status: SHIPPED | OUTPATIENT
Start: 2024-02-14

## 2024-02-14 RX ORDER — KETOROLAC TROMETHAMINE 30 MG/ML
60 INJECTION, SOLUTION INTRAMUSCULAR; INTRAVENOUS
Status: COMPLETED | OUTPATIENT
Start: 2024-02-14 | End: 2024-02-14

## 2024-02-14 RX ORDER — METHYLPREDNISOLONE ACETATE 80 MG/ML
80 INJECTION, SUSPENSION INTRA-ARTICULAR; INTRALESIONAL; INTRAMUSCULAR; SOFT TISSUE ONCE
Status: COMPLETED | OUTPATIENT
Start: 2024-02-14 | End: 2024-02-14

## 2024-02-14 RX ADMIN — KETOROLAC TROMETHAMINE 60 MG: 30 INJECTION, SOLUTION INTRAMUSCULAR; INTRAVENOUS at 04:02

## 2024-02-14 RX ADMIN — METHYLPREDNISOLONE ACETATE 80 MG: 80 INJECTION, SUSPENSION INTRA-ARTICULAR; INTRALESIONAL; INTRAMUSCULAR; SOFT TISSUE at 04:02

## 2024-02-19 DIAGNOSIS — M25.551 RIGHT HIP PAIN: Primary | ICD-10-CM

## 2024-02-20 ENCOUNTER — OFFICE VISIT (OUTPATIENT)
Dept: ORTHOPEDICS | Facility: CLINIC | Age: 39
End: 2024-02-20
Payer: COMMERCIAL

## 2024-02-20 ENCOUNTER — HOSPITAL ENCOUNTER (OUTPATIENT)
Dept: RADIOLOGY | Facility: HOSPITAL | Age: 39
Discharge: HOME | End: 2024-02-20
Attending: NURSE PRACTITIONER
Payer: COMMERCIAL

## 2024-02-20 DIAGNOSIS — M25.551 RIGHT HIP PAIN: ICD-10-CM

## 2024-02-20 DIAGNOSIS — G89.29 CHRONIC RIGHT SI JOINT PAIN: Primary | ICD-10-CM

## 2024-02-20 DIAGNOSIS — M53.3 CHRONIC RIGHT SI JOINT PAIN: Primary | ICD-10-CM

## 2024-02-20 PROCEDURE — 73502 X-RAY EXAM HIP UNI 2-3 VIEWS: CPT | Mod: 26,RT,, | Performed by: RADIOLOGY

## 2024-02-20 PROCEDURE — 73502 X-RAY EXAM HIP UNI 2-3 VIEWS: CPT | Mod: TC,RT

## 2024-02-20 PROCEDURE — 99213 OFFICE O/P EST LOW 20 MIN: CPT | Mod: PBBFAC,25 | Performed by: NURSE PRACTITIONER

## 2024-02-20 PROCEDURE — 99203 OFFICE O/P NEW LOW 30 MIN: CPT | Mod: S$PBB,,, | Performed by: NURSE PRACTITIONER

## 2024-02-20 RX ORDER — DICLOFENAC SODIUM 75 MG/1
75 TABLET, DELAYED RELEASE ORAL 2 TIMES DAILY
Qty: 60 TABLET | Refills: 0 | Status: SHIPPED | OUTPATIENT
Start: 2024-02-20

## 2024-02-20 NOTE — PROGRESS NOTES
39 y.o. Female returns to clinic for a follow up visit regarding     ICD-10-CM ICD-9-CM   1. Chronic right SI joint pain  M53.3 724.6    G89.29 338.29        Patient is here today with complaint of right hip and buttock pain.  Reports pain started last Monday.  It is painful to sit.  Pain is shooting down her leg and starts in her right buttock area.  She has had this problem in the past.       Past Medical History:   Diagnosis Date    Cervical cancer     Emphysema of lung     IBS (irritable bowel syndrome)      Past Surgical History:   Procedure Laterality Date    ADENOIDECTOMY      HERNIA REPAIR  2010    HYSTERECTOMY      SPINE SURGERY      TONSILLECTOMY           PHYSICAL EXAMINATION:    General    Constitutional: She is oriented to person, place, and time. She appears well-nourished.   HENT:   Head: Normocephalic and atraumatic.   Eyes: Pupils are equal, round, and reactive to light.   Neck: Neck supple.   Cardiovascular:  Normal rate and regular rhythm.            Pulmonary/Chest: Effort normal. No respiratory distress.   Abdominal: There is no abdominal tenderness. There is no guarding.   Neurological: She is alert and oriented to person, place, and time. She has normal reflexes.   Psychiatric: She has a normal mood and affect. Her behavior is normal. Judgment and thought content normal.             Right Hip Exam     Inspection   Scars: absent  Swelling: absent  Bruising: absent  No deformity of hip.    Tenderness   The patient tender to palpation of the SI joint.    Crepitus   The patient has crepitus of the SI joint.    Range of Motion   Extension:  normal   Flexion:  normal   External rotation:  normal   Internal rotation:  normal     Tests   Pain w/ forced internal rotation (MATEO): absent  Pain w/ forced external rotation (FADIR): absent  Log Roll: negative    Other   Sensation: normal    Comments:  Point tenderness in right SI joint        Vascular Exam     Right Pulses  Dorsalis Pedis:       2+          IMAGING:  X-Ray Hip 2 or 3 views Right (with Pelvis when performed)    Result Date: 2/20/2024  EXAMINATION: XR HIP WITH PELVIS WHEN PERFORMED, 2 OR 3  VIEWS RIGHT CLINICAL HISTORY: Pain in right hip TECHNIQUE: AP view of the pelvis and frog leg lateral view of the right hip were performed. COMPARISON: None FINDINGS: No fracture or dislocation.  Joint spaces are well preserved of the right and what is seen of the left hip.  Soft tissues are unremarkable.     No abnormality identified. Electronically signed by: Deniz Anderson Date:    02/20/2024 Time:    10:05    X-Ray Lumbar Spine 5 View    Result Date: 2/14/2024  EXAMINATION: XR LUMBAR SPINE COMPLETE 5 VIEW CLINICAL HISTORY: Lumbago with sciatica, left side TECHNIQUE: Lumbar spine, AP lateral and oblique views: COMPARISON: 04/28/2021 FINDINGS: Mild dextroscoliosis is present.  There is sacralization of L5.  Degenerative disc changes are present at L3-4 and L4-5.  No subluxation is seen.  SI joints are normal.     Degenerative disc changes are present at L3-4 and L4-5. Place of service: [facility] Electronically signed by: Nicky Newman Date:    02/14/2024 Time:    15:48       ASSESSMENT:      ICD-10-CM ICD-9-CM   1. Chronic right SI joint pain  M53.3 724.6    G89.29 338.29       PLAN:     -Findings and treatment options were discussed with the patient  -All questions answered    Diclofenac p.o. b.i.d..  We will set up 4 weeks of formal physical therapy.  Return to clinic in 6 weeks if no better    There are no Patient Instructions on file for this visit.      Orders Placed This Encounter   Procedures    Ambulatory referral/consult to Physical/Occupational Therapy         Procedures

## 2024-02-21 ENCOUNTER — CLINICAL SUPPORT (OUTPATIENT)
Dept: REHABILITATION | Facility: HOSPITAL | Age: 39
End: 2024-02-21
Payer: COMMERCIAL

## 2024-02-21 DIAGNOSIS — G89.29 CHRONIC RIGHT SI JOINT PAIN: ICD-10-CM

## 2024-02-21 DIAGNOSIS — M79.18 RIGHT BUTTOCK PAIN: ICD-10-CM

## 2024-02-21 DIAGNOSIS — M53.3 CHRONIC RIGHT SI JOINT PAIN: ICD-10-CM

## 2024-02-21 DIAGNOSIS — G57.01 PIRIFORMIS SYNDROME OF RIGHT SIDE: Primary | ICD-10-CM

## 2024-02-21 PROCEDURE — 97110 THERAPEUTIC EXERCISES: CPT

## 2024-02-21 PROCEDURE — 97161 PT EVAL LOW COMPLEX 20 MIN: CPT

## 2024-02-21 NOTE — PLAN OF CARE
[facility]   Physical Therapy Initial Evaluation      Name: Chloe Nickerson  [facility] Number: [id number]    Therapy Diagnosis:   Encounter Diagnosis   Name Primary?    Chronic right SI joint pain         Physician: Zayda Young FNP    Physician Orders: PT Eval and Treat   Medical Diagnosis from Referral: see above  Evaluation Date: 2/21/2024  Authorization Period Expiration: n/a  Plan of Care Expiration: 4/19/2024    Date of Surgery: n/a  Visit # / Visits authorized: 1/ 20 then BMN   FOTO: 1/3 = 60    Precautions: Standard     Time In: 8:00 am  Time Out: 8:50 am  Total Billable Time: 50 minutes    Subjective     Date of onset: last Monday    History of current condition - Chloe reports: started having right buttock pain that goes all the way down the back of her thigh and down the outside of her lower leg - does feel a little numb along lower leg - sitting and lying down are worse - standing is more comfortable - says her hip had been hurting a little bit a few weeks before but was fine - says she was sitting on the couch on the right hip with her knees bent under her - her  gave her a hug and kind of put pressure on the hip and says she had an instant pain and it has not stopped hurting since -      Falls: n/a    Imaging: xrays of right hip and lumbar spine    Prior Therapy: no  Social History:  lives with their family  Occupation: works at [employer]  Prior Level of Function: independent   Current Level of Function: independent     Pain:  Current 4/10, worst 8/10, best 2/10   Location: right buttocks , lower leg, and upper leg   Description: Aching, Throbbing, Numb, Shooting, and Variable  Aggravating Factors: Sitting, Laying, and Night Time  Easing Factors: hot bath and stretching, ice, heating pad    Patients goals: get rid of right buttock pain     Medical History:   Past Medical History:   Diagnosis Date    Cervical cancer     Emphysema of lung     IBS  (irritable bowel syndrome)        Surgical History:   Chloe Nickerson  has a past surgical history that includes Hysterectomy; Tonsillectomy; Spine surgery; Adenoidectomy; and Hernia repair (2010).    Medications:   Chloe has a current medication list which includes the following prescription(s): diclofenac, gabapentin, naproxen sodium, nitrofurantoin (macrocrystal-monohydrate), pantoprazole, and tizanidine.    Allergies:   Review of patient's allergies indicates:   Allergen Reactions    Opioids - morphine analogues         Objective      Range of motion:  Motion Right Left    Hip flexion  WITHIN FUNCTIONAL LIMITS  WITHIN FUNCTIONAL LIMITS   Hip extension  WITHIN FUNCTIONAL LIMITS  WITHIN FUNCTIONAL LIMITS   Hip abduction  WITHIN FUNCTIONAL LIMITS  WITHIN FUNCTIONAL LIMITS   Hip adduction  WITHIN FUNCTIONAL LIMITS  WITHIN FUNCTIONAL LIMITS   Internal rotation  WITHIN FUNCTIONAL LIMITS  WITHIN FUNCTIONAL LIMITS   External rotation  WITHIN FUNCTIONAL LIMITS  WITHIN FUNCTIONAL LIMITS   Knee extension  WITHIN FUNCTIONAL LIMITS  WITHIN FUNCTIONAL LIMITS   Knee flexion  WITHIN FUNCTIONAL LIMITS  WITHIN FUNCTIONAL LIMITS       Manual muscle test   Muscle Right  Left    Hip flexion  MMT strength: 4+/5  MMT strength: 4+/5   Hip extension  MMT strength: 4/5  MMT strength: 4/5   Hip abduction  MMT strength: 4/5  MMT strength: 4/5   Hip adduction  MMT strength: 4/5  MMT strength: 4/5   Hip internal rotation  MMT strength: 4/5  MMT strength: 4/5   Hip external rotation  MMT strength: 4/5  MMT strength: 4/5   Knee extension  MMT strength: 5/5  MMT strength: 5/5   Knee flexion  MMT strength: 5/5  MMT strength: 5/5       Gait:  Weight bearing precautions: FWB  Assistive device: none  Ambulation distance and deviations:  Stairs:  Comments:      Clinical Special Tests:  A. Hip  1. Leg length: right WITHIN FUNCTIONAL LIMITS left WITHIN FUNCTIONAL LIMITS  2. Dereck test: right Negative left Negative  3. Hip scour test: right  Negative left Negative  4. Piriformis test: right Positive left Negative  5. Michelle's test: right Negative left Negative      Intake Outcome Measure for FOTO Pelvis Survey    Therapist reviewed FOTO scores for Chloe Nickerson on 2/21/2024.   FOTO report - see Media section or FOTO account episode details.    Intake Score: 60         Treatment     Total Treatment time (time-based codes) separate from Evaluation: 15 minutes     Chloe received the treatments listed below:      Seated piriformis stretch, standing hamstring stretch, seated sciatic nerve glide, supine figure 4 piriformis stretch, knee to opposite shoulder piriformis stretch, sidelying ITB stretch, supine sciatic nerve glide    Patient Education and Home Exercises     Education provided:   - evaluation results, plan of care and home exercise program     Written Home Exercises Provided: yes. Exercises were reviewed and Chloe was able to demonstrate them prior to the end of the session.  Chloe demonstrated good  understanding of the education provided. See EMR under Patient Instructions for exercises provided during therapy sessions.    Assessment     Chloe is a 39 y.o. female referred to outpatient Physical Therapy with a medical diagnosis of chronic right SI joint pain. Patient presents with complaints of right buttock pain consistent with piriformis syndrome. The pain travels down the back of the thigh and into the lower leg. She does have some numbness and tingling in the right lower extremity as well.     Patient prognosis is Good.   Patient will benefit from skilled outpatient Physical Therapy to address the deficits stated above and in the chart below, provide patient /family education, and to maximize patientt's level of independence.     Plan of care discussed with patient: Yes  Patient's spiritual, cultural and educational needs considered and patient is agreeable to the plan of care and goals as stated below:     Anticipated Barriers for  therapy: none    Medical Necessity is demonstrated by the following  History  Co-morbidities and personal factors that may impact the plan of care [x] LOW: no personal factors / co-morbidities  [] MODERATE: 1-2 personal factors / co-morbidities  [] HIGH: 3+ personal factors / co-morbidities    Moderate / High Support Documentation:   Co-morbidities affecting plan of care: none    Personal Factors:   no deficits     Examination  Body Structures and Functions, activity limitations and participation restrictions that may impact the plan of care [x] LOW: addressing 1-2 elements  [] MODERATE: 3+ elements  [] HIGH: 4+ elements (please support below)    Moderate / High Support Documentation: n/a     Clinical Presentation [x] LOW: stable  [] MODERATE: Evolving  [] HIGH: Unstable     Decision Making/ Complexity Score: low       Goals:  Patient will be independent with home exercise program to facilitate carryover between visits.  Patient will report decrease in pain to </= 2/10 at worst to improve quality of life.  Patient will report decrease in radiating occurrences from daily to no more than 3 times weekly to allow patient the ability to perform activities of daily living.   Patient will increase bilateral hip strength to 5/5 to improve ambulation ability.  5.   Patient will increase right hamstring 90/90 by 10 degrees for improved mobility.    Plan     Plan of care Certification: 2/21/2024 to 4/19/2024.    Outpatient Physical Therapy 2 times weekly for 8 weeks to include the following interventions: Cervical/Lumbar Traction, Manual Therapy, Moist Heat/ Ice, Neuromuscular Re-ed, Patient Education, Therapeutic Activities, and Therapeutic Exercise.     JEFF JESSICA, PT        Physician's Signature: _________________________________________ Date: ________________

## 2024-02-26 PROBLEM — M53.3 CHRONIC RIGHT SI JOINT PAIN: Status: ACTIVE | Noted: 2024-02-26

## 2024-02-26 PROBLEM — M79.18 RIGHT BUTTOCK PAIN: Status: ACTIVE | Noted: 2024-02-26

## 2024-02-26 PROBLEM — G57.01 PIRIFORMIS SYNDROME OF RIGHT SIDE: Status: ACTIVE | Noted: 2024-02-26

## 2024-02-26 PROBLEM — G89.29 CHRONIC RIGHT SI JOINT PAIN: Status: ACTIVE | Noted: 2024-02-26

## 2024-02-27 ENCOUNTER — CLINICAL SUPPORT (OUTPATIENT)
Dept: REHABILITATION | Facility: HOSPITAL | Age: 39
End: 2024-02-27
Payer: COMMERCIAL

## 2024-02-27 DIAGNOSIS — G57.01 PIRIFORMIS SYNDROME OF RIGHT SIDE: ICD-10-CM

## 2024-02-27 DIAGNOSIS — M53.3 CHRONIC RIGHT SI JOINT PAIN: Primary | ICD-10-CM

## 2024-02-27 DIAGNOSIS — G89.29 CHRONIC RIGHT SI JOINT PAIN: Primary | ICD-10-CM

## 2024-02-27 DIAGNOSIS — M79.18 RIGHT BUTTOCK PAIN: ICD-10-CM

## 2024-02-27 PROBLEM — E66.01 CLASS 3 OBESITY: Status: ACTIVE | Noted: 2024-02-27

## 2024-02-27 PROBLEM — E66.813 CLASS 3 OBESITY: Status: ACTIVE | Noted: 2024-02-27

## 2024-02-27 PROCEDURE — 97112 NEUROMUSCULAR REEDUCATION: CPT | Mod: CQ

## 2024-02-27 PROCEDURE — 97140 MANUAL THERAPY 1/> REGIONS: CPT | Mod: CQ

## 2024-02-27 PROCEDURE — 97110 THERAPEUTIC EXERCISES: CPT | Mod: CQ

## 2024-02-27 NOTE — PROGRESS NOTES
[facility]   Physical Therapy Treatment Note      Name: Chloe Nickerson  Clinic Number: [id number]    Therapy Diagnosis:   Encounter Diagnoses   Name Primary?    Chronic right SI joint pain Yes    Piriformis syndrome of right side     Right buttock pain      Physician: No ref. provider found    Visit Date: 2/27/2024    Physician Orders: PT Eval and Treat   Medical Diagnosis from Referral: see above  Evaluation Date: 2/21/2024  Authorization Period Expiration: n/a  Plan of Care Expiration: 4/19/2024     Date of Surgery: n/a  Visit # / Visits authorized: 1/ 20 then BMN   FOTO: 1/3 = 60     Precautions: Standard     PTA Visit #: 1/5     Time In: 7:00 am  Time Out: 7:45  Total Billable Time: 45 minutes    Subjective     Pt reports: that she is feeling better, but still having some hip pain.  She was compliant with home exercise program.  Response to previous treatment: no complaints   Functional change: none identified    Pain: 2/10  Location: right buttocks      Objective      Objective Measures updated at progress report unless specified.     Treatment     Chloe received the treatments listed below:      therapeutic exercises to develop strength, endurance, ROM, flexibility, posture, and core stabilization for 29 minutes including:  Seated piriformis stretch 3 x 30 sec  standing hamstring stretch-3 x 30 sec  seated sciatic nerve glide-10 x 10 sec  supine figure 4 piriformis stretch-3 x 30 sec  knee to opposite shoulder piriformis stretch--3 x 30 sec  sidelying ITB stretch-3 x 30 sec  supine sciatic nerve glide-10 x 10 sec  Prone press ups--3 x 5   Prone hip ext--3 x 10  Standing extension over mat--3 x 10    manual therapy techniques: Joint mobilizations, Manual traction, Myofacial release, Manual Lymphatic Drainage, Soft tissue Mobilization, and Friction Massage were applied to the: R piriformis for 8 minutes, including:  MFR with tennis ball    neuromuscular re-education  "activities to improve: Balance, Coordination, Kinesthetic, Sense, Proprioception, and Posture for 8 minutes. The following activities were included:  Hooklying clam shells--Green band--3 x 10    therapeutic activities to improve functional performance for 0  minutes, including:  -    Patient Education and Home Exercises       Education provided:   - review of home exercise program and current Plan of Care/rational of treatment.    Written Home Exercises Provided: Patient instructed to cont prior HEP. Exercises were reviewed and Chloe was able to demonstrate them prior to the end of the session.  Chloe demonstrated good understanding of the education provided. See EMR under Patient Instructions for exercises provided during therapy sessions    Assessment     At Evaluation:  Chloe is a 39 y.o. female referred to outpatient Physical Therapy with a medical diagnosis of chronic right SI joint pain. Patient presents with complaints of right buttock pain consistent with piriformis syndrome. The pain travels down the back of the thigh and into the lower leg. She does have some numbness and tingling in the right lower extremity as well    Current Assessment:  Arrived with mild "sciatic" pain.  Reports that the stretches have helped.  Session focused on manuals to R piriformis, stretching, and basic strengthening to posterior chain.  Did report some pain in L4/5 dermatome with seated piriformis stretch.  Did do some prone press ups and hip extension.... this eased the pain down her leg.  Will progress as able... Cont POC    Chloe Is progressing towards her goals.   Pt prognosis is Good.     Pt will continue to benefit from skilled outpatient physical therapy to address the deficits listed in the problem list box on initial evaluation, provide pt/family education and to maximize pt's level of independence in the home and community environment.     Pt's spiritual, cultural and educational needs considered and pt " agreeable to plan of care and goals.     Anticipated barriers to physical therapy: none    Goals:   Patient will be independent with home exercise program to facilitate carryover between visits.  Patient will report decrease in pain to </= 2/10 at worst to improve quality of life.  Patient will report decrease in radiating occurrences from daily to no more than 3 times weekly to allow patient the ability to perform activities of daily living.   Patient will increase bilateral hip strength to 5/5 to improve ambulation ability.  5.   Patient will increase right hamstring 90/90 by 10 degrees for improved mobility.    Plan     Plan of care Certification: 2/21/2024 to 4/19/2024.     Outpatient Physical Therapy 2 times weekly for 8 weeks to include the following interventions: Cervical/Lumbar Traction, Manual Therapy, Moist Heat/ Ice, Neuromuscular Re-ed, Patient Education, Therapeutic Activities, and Therapeutic Exercise.     Karl Haddad, PTA   02/27/2024

## 2024-02-27 NOTE — PROGRESS NOTES
"Subjective     Patient ID: Chloe Nickerson is a 39 y.o. female.    Chief Complaint: Hip Pain (Patient said she feel more pain when setting and laying down ) and Sciatica    38 yo WF here with complaints of constant pain down her right leg for 3 days. Had back surgery in 2011 with . Had surgery for cervical cancer with chemo and radiation also.     Hip Pain       Review of Systems   Constitutional:  Negative for activity change.   Eyes:  Negative for visual disturbance.   Respiratory:  Negative for shortness of breath.    Cardiovascular:  Negative for chest pain.   Gastrointestinal:  Negative for abdominal pain.   Musculoskeletal:  Positive for back pain and leg pain.   Neurological:  Negative for headaches.   Psychiatric/Behavioral:  Negative for dysphoric mood.             Objective   Blood pressure 124/80, pulse 74, temperature 98 °F (36.7 °C), temperature source Oral, resp. rate 18, height 5' 4" (1.626 m), weight 106.6 kg (235 lb), SpO2 100 %.    Physical Exam  Constitutional:       Appearance: Normal appearance.      Comments: Pacing around the room in some acute pain.    HENT:      Head: Normocephalic and atraumatic.      Right Ear: External ear normal.      Left Ear: External ear normal.      Nose: Nose normal.      Mouth/Throat:      Mouth: Mucous membranes are moist.   Eyes:      Conjunctiva/sclera: Conjunctivae normal.      Pupils: Pupils are equal, round, and reactive to light.   Cardiovascular:      Rate and Rhythm: Normal rate and regular rhythm.      Pulses: Normal pulses.      Heart sounds: Normal heart sounds.   Pulmonary:      Effort: Pulmonary effort is normal.      Breath sounds: Normal breath sounds.   Abdominal:      General: Abdomen is flat.      Palpations: Abdomen is soft.   Musculoskeletal:         General: Normal range of motion.      Cervical back: Normal range of motion and neck supple.   Skin:     General: Skin is warm and dry.   Neurological:      General: No focal deficit " present.      Mental Status: She is alert and oriented to person, place, and time.   Psychiatric:         Mood and Affect: Mood normal.         Behavior: Behavior normal.         Thought Content: Thought content normal.         Judgment: Judgment normal.            Assessment and Plan     1. Sciatica of right side  -     X-Ray Lumbar Spine 5 View; Future; Expected date: 02/14/2024  -     ketorolac injection 60 mg  -     methylPREDNISolone acetate injection 80 mg  -     tiZANidine (ZANAFLEX) 4 MG tablet; 1 or 2 tablets by mouth at HS for back pain and spasms  Dispense: 60 tablet; Refill: 11  -     naproxen sodium (ANAPROX) 550 MG tablet; Take 1 tablet (550 mg total) by mouth 2 (two) times daily as needed (pain).  Dispense: 60 tablet; Refill: 11  -     gabapentin (NEURONTIN) 100 MG capsule; Start with 1 capsules at bedtime and gradually increase to 2 or 3 at bedtime.  Dispense: 90 capsule; Refill: 11    2. Dorsalgia, unspecified  -     MRI Lumbar Spine Without Contrast; Future; Expected date: 02/14/2024    3. Class 3 obesity    4. Malignant neoplasm of exocervix        Consider PT. Refer NSG PRN Continue meds. Consider more steroids.          Follow up if symptoms worsen or fail to improve.

## 2024-02-29 ENCOUNTER — CLINICAL SUPPORT (OUTPATIENT)
Dept: REHABILITATION | Facility: HOSPITAL | Age: 39
End: 2024-02-29
Payer: COMMERCIAL

## 2024-02-29 DIAGNOSIS — M79.18 RIGHT BUTTOCK PAIN: ICD-10-CM

## 2024-02-29 DIAGNOSIS — G89.29 CHRONIC RIGHT SI JOINT PAIN: Primary | ICD-10-CM

## 2024-02-29 DIAGNOSIS — M53.3 CHRONIC RIGHT SI JOINT PAIN: Primary | ICD-10-CM

## 2024-02-29 DIAGNOSIS — G57.01 PIRIFORMIS SYNDROME OF RIGHT SIDE: ICD-10-CM

## 2024-02-29 PROCEDURE — 97140 MANUAL THERAPY 1/> REGIONS: CPT | Mod: CQ

## 2024-02-29 PROCEDURE — 97112 NEUROMUSCULAR REEDUCATION: CPT | Mod: CQ

## 2024-02-29 PROCEDURE — 97110 THERAPEUTIC EXERCISES: CPT | Mod: CQ

## 2024-02-29 NOTE — PROGRESS NOTES
[facility]   Physical Therapy Treatment Note      Name: Chloe Nickerson  Clinic Number: [id number]    Therapy Diagnosis:   Encounter Diagnoses   Name Primary?    Chronic right SI joint pain Yes    Piriformis syndrome of right side     Right buttock pain      Physician: Zayda Young FNP    Visit Date: 2/29/2024    Physician Orders: PT Eval and Treat   Medical Diagnosis from Referral: see above  Evaluation Date: 2/21/2024  Authorization Period Expiration: n/a  Plan of Care Expiration: 4/19/2024     Date of Surgery: n/a  Visit # / Visits authorized: 3/ 20 then BMN   FOTO: 1/3 = 60     Precautions: Standard     PTA Visit #: 2/5     Time In: 4:45 pm  Time Out: 5:25 pm  Total Billable Time: 40 minutes    Subjective     Pt reports: that she is feeling better, but still having pain in lateral hip and down iliotibial band.  She was compliant with home exercise program.  Response to previous treatment: no complaints   Functional change: none identified    Pain: 2/10  Location: right buttocks      Objective      Objective Measures updated at progress report unless specified.   Case conference with Kristin Hill PT, for initial PTA visit.     Treatment     Chloe received the treatments listed below:      therapeutic exercises to develop strength, endurance, ROM, flexibility, posture, and core stabilization for 23 minutes including:  Seated piriformis stretch 3 x 30 seconds (not performed today)   standing hamstring stretch-3 x 30 seconds (not performed today)   supine figure 4 piriformis stretch-3 x 30 seconds   knee to opposite shoulder piriformis stretch--3 x 30 seconds   sidelying ITB stretch-3 x 30 seconds   supine sciatic nerve glide-10 x 10 seconds   Prone press ups--3 x 5   Prone hip ext-- x 2 stopped due to pain  Prone on elbows x 3 minutes   Standing extension over mat--3 x 10 (not performed today)   Bridges x 20    manual therapy techniques: Joint mobilizations, Manual  traction, Myofacial release, Manual Lymphatic Drainage, Soft tissue Mobilization, and Friction Massage were applied to the: R piriformis for 8 minutes, including:  Myofascial release with green roller along piriformis and iliotibial band     neuromuscular re-education activities to improve: Balance, Coordination, Kinesthetic, Sense, Proprioception, and Posture for 8 minutes. The following activities were included:  Hooklying clam shells--Green band--3 x 10  Pelvic tilts x 20    therapeutic activities to improve functional performance for 0  minutes, including:  -    Patient Education and Home Exercises       Education provided:   - review of home exercise program and current Plan of Care/rational of treatment.    Written Home Exercises Provided: Patient instructed to cont prior HEP. Exercises were reviewed and Chloe was able to demonstrate them prior to the end of the session.  Chloe demonstrated good understanding of the education provided. See EMR under Patient Instructions for exercises provided during therapy sessions    Assessment     At Evaluation:  Chloe is a 39 y.o. female referred to outpatient Physical Therapy with a medical diagnosis of chronic right SI joint pain. Patient presents with complaints of right buttock pain consistent with piriformis syndrome. The pain travels down the back of the thigh and into the lower leg. She does have some numbness and tingling in the right lower extremity as well    Current Assessment:  Chloe reported that she thinks she is getting better - home exercise program is helping. She still hurts at night a good bit. Primary complaint of pain today was over right GT. She was tender to palpation. Prone hip extension on left caused pain in right hip so stopped and held this exercise for today. Bridges were difficult but not painful. Will progress as tolerated.     Chloe Is progressing towards her goals.   Pt prognosis is Good.     Pt will continue to benefit from  skilled outpatient physical therapy to address the deficits listed in the problem list box on initial evaluation, provide pt/family education and to maximize pt's level of independence in the home and community environment.     Pt's spiritual, cultural and educational needs considered and pt agreeable to plan of care and goals.     Anticipated barriers to physical therapy: none    Goals:   Patient will be independent with home exercise program to facilitate carryover between visits.  Patient will report decrease in pain to </= 2/10 at worst to improve quality of life.  Patient will report decrease in radiating occurrences from daily to no more than 3 times weekly to allow patient the ability to perform activities of daily living.   Patient will increase bilateral hip strength to 5/5 to improve ambulation ability.  5.   Patient will increase right hamstring 90/90 by 10 degrees for improved mobility.    Plan     Plan of care Certification: 2/21/2024 to 4/19/2024.     Outpatient Physical Therapy 2 times weekly for 8 weeks to include the following interventions: Cervical/Lumbar Traction, Manual Therapy, Moist Heat/ Ice, Neuromuscular Re-ed, Patient Education, Therapeutic Activities, and Therapeutic Exercise.     Dorcas Myrick, PTA   02/29/2024

## 2024-03-05 ENCOUNTER — CLINICAL SUPPORT (OUTPATIENT)
Dept: REHABILITATION | Facility: HOSPITAL | Age: 39
End: 2024-03-05
Payer: COMMERCIAL

## 2024-03-05 DIAGNOSIS — M53.3 CHRONIC RIGHT SI JOINT PAIN: Primary | ICD-10-CM

## 2024-03-05 DIAGNOSIS — M79.18 RIGHT BUTTOCK PAIN: ICD-10-CM

## 2024-03-05 DIAGNOSIS — G89.29 CHRONIC RIGHT SI JOINT PAIN: Primary | ICD-10-CM

## 2024-03-05 DIAGNOSIS — G57.01 PIRIFORMIS SYNDROME OF RIGHT SIDE: ICD-10-CM

## 2024-03-05 PROCEDURE — 97112 NEUROMUSCULAR REEDUCATION: CPT | Mod: CQ

## 2024-03-05 PROCEDURE — 97110 THERAPEUTIC EXERCISES: CPT | Mod: CQ

## 2024-03-05 NOTE — PROGRESS NOTES
[facility]   Physical Therapy Treatment Note      Name: Chloe Nickerson  Clinic Number: [id number]    Therapy Diagnosis:   Encounter Diagnoses   Name Primary?    Chronic right SI joint pain Yes    Piriformis syndrome of right side     Right buttock pain      Physician: Zayda Young FNP    Visit Date: 3/5/2024    Physician Orders: PT Eval and Treat   Medical Diagnosis from Referral: see above  Evaluation Date: 2/21/2024  Authorization Period Expiration: n/a  Plan of Care Expiration: 4/19/2024     Date of Surgery: n/a  Visit # / Visits authorized: 4/ 20 then BMN   FOTO: 1/3 = 60     Precautions: Standard     PTA Visit #: 2/5     Time In: 4:45 pm  Time Out: 5:31 pm  Total Billable Time: 45 minutes    Subjective     Pt reports: that she stepped into tub to get stopper last night - kids left it on the other side - slipped and hip popped. It scared her a little but felt better almost immediately and she has had very little pain today. She is having minimal radiating pain.   She was compliant with home exercise program.  Response to previous treatment: no complaints   Functional change: less pain    Pain: 1/10  Location: right buttocks      Objective      Objective Measures updated at progress report unless specified.     Treatment     Chloe received the treatments listed below:      therapeutic exercises to develop strength, endurance, ROM, flexibility, posture, and core stabilization for 37 minutes including:  Bike x 5 minutes   Seated piriformis stretch 3 x 30 seconds    standing hamstring stretch-3 x 30 seconds    supine figure 4 piriformis stretch-3 x 30 seconds   knee to opposite shoulder piriformis stretch--3 x 30 seconds   sidelying ITB stretch-3 x 30 seconds   supine sciatic nerve glide-   Prone press ups--4 x 5   Prone hip ext--   Prone on elbows x 3 minutes   Standing extension over mat--3 x 10   Bridges x 20    manual therapy techniques: Joint mobilizations, Manual  traction, Myofacial release, Manual Lymphatic Drainage, Soft tissue Mobilization, and Friction Massage were applied to the: R piriformis for 0 minutes, including:  Myofascial release with green roller along piriformis and iliotibial band     neuromuscular re-education activities to improve: Balance, Coordination, Kinesthetic, Sense, Proprioception, and Posture for 8 minutes. The following activities were included:  Hooklying clam shells--Green band--3 x 10  Pelvic tilts 10 x 10 second hold     therapeutic activities to improve functional performance for 0  minutes, including:  -    Patient Education and Home Exercises       Education provided:   - review of home exercise program and current Plan of Care/rational of treatment.    Written Home Exercises Provided: Patient instructed to cont prior HEP. Exercises were reviewed and Chloe was able to demonstrate them prior to the end of the session.  Chloe demonstrated good understanding of the education provided. See EMR under Patient Instructions for exercises provided during therapy sessions    Assessment     At Evaluation:  Chloe is a 39 y.o. female referred to outpatient Physical Therapy with a medical diagnosis of chronic right SI joint pain. Patient presents with complaints of right buttock pain consistent with piriformis syndrome. The pain travels down the back of the thigh and into the lower leg. She does have some numbness and tingling in the right lower extremity as well    Current Assessment:  Chloe reported that she was pretty sore after last treatment but she was also able to do more over the weekend. She wore wedges Sunday and did have some pain afterward. She felt a pop last night and has not had much radicular pain since then. Will progress as tolerated.     Chloe Is progressing towards her goals.   Pt prognosis is Good.     Pt will continue to benefit from skilled outpatient physical therapy to address the deficits listed in the problem list box  on initial evaluation, provide pt/family education and to maximize pt's level of independence in the home and community environment.     Pt's spiritual, cultural and educational needs considered and pt agreeable to plan of care and goals.     Anticipated barriers to physical therapy: none    Goals:   Patient will be independent with home exercise program to facilitate carryover between visits.  Patient will report decrease in pain to </= 2/10 at worst to improve quality of life.  Patient will report decrease in radiating occurrences from daily to no more than 3 times weekly to allow patient the ability to perform activities of daily living.   Patient will increase bilateral hip strength to 5/5 to improve ambulation ability.  5.   Patient will increase right hamstring 90/90 by 10 degrees for improved mobility.    Plan     Plan of care Certification: 2/21/2024 to 4/19/2024.     Outpatient Physical Therapy 2 times weekly for 8 weeks to include the following interventions: Cervical/Lumbar Traction, Manual Therapy, Moist Heat/ Ice, Neuromuscular Re-ed, Patient Education, Therapeutic Activities, and Therapeutic Exercise.     Dorcas Myrick, PTA   03/05/2024

## 2024-03-07 ENCOUNTER — CLINICAL SUPPORT (OUTPATIENT)
Dept: REHABILITATION | Facility: HOSPITAL | Age: 39
End: 2024-03-07
Payer: COMMERCIAL

## 2024-03-07 DIAGNOSIS — M79.18 RIGHT BUTTOCK PAIN: ICD-10-CM

## 2024-03-07 DIAGNOSIS — G89.29 CHRONIC RIGHT SI JOINT PAIN: Primary | ICD-10-CM

## 2024-03-07 DIAGNOSIS — G57.01 PIRIFORMIS SYNDROME OF RIGHT SIDE: ICD-10-CM

## 2024-03-07 DIAGNOSIS — M53.3 CHRONIC RIGHT SI JOINT PAIN: Primary | ICD-10-CM

## 2024-03-07 PROCEDURE — 97124 MASSAGE THERAPY: CPT | Mod: CQ

## 2024-03-07 PROCEDURE — 97110 THERAPEUTIC EXERCISES: CPT | Mod: CQ

## 2024-03-07 PROCEDURE — 97112 NEUROMUSCULAR REEDUCATION: CPT | Mod: CQ

## 2024-03-07 NOTE — PROGRESS NOTES
[facility]   Physical Therapy Treatment Note      Name: Chloe Nickerson  Clinic Number: [id number]    Therapy Diagnosis:   Encounter Diagnoses   Name Primary?    Chronic right SI joint pain Yes    Piriformis syndrome of right side     Right buttock pain      Physician: Zayda Young FNP    Visit Date: 3/7/2024    Physician Orders: PT Eval and Treat   Medical Diagnosis from Referral: see above  Evaluation Date: 2/21/2024  Authorization Period Expiration: n/a  Plan of Care Expiration: 4/19/2024     Date of Surgery: n/a  Visit # / Visits authorized: 5/ 20 then BMN   FOTO: 1/3 = 60     Precautions: Standard     PTA Visit #: 4/5     Time In: 4:37 pm  Time Out: 5:22 pm  Total Billable Time: 42 minutes    Subjective     Pt reports: she felt better for a day and a half and now she hurts again.   She was compliant with home exercise program.  Response to previous treatment: no complaints   Functional change: less pain    Pain: 1/10  Location: right buttocks      Objective      Objective Measures updated at progress report unless specified.     Treatment     Chloe received the treatments listed below:      therapeutic exercises to develop strength, endurance, ROM, flexibility, posture, and core stabilization for 20 minutes including:  Bike x 5 minutes   Seated piriformis stretch 3 x 30 seconds    standing hamstring stretch-3 x 30 seconds  (not performed today)   supine figure 4 piriformis stretch-3 x 30 seconds   knee to opposite shoulder piriformis stretch--3 x 30 seconds   sidelying ITB stretch-3 x 30 seconds (not performed today)   supine sciatic nerve glide- (not performed today)   Prone press ups--4 x 5 (not performed today)   Prone hip ext-- (not performed today)   Prone on elbows x 3 minutes (not performed today)   Standing extension over mat--3 x 10 (not performed today)   Bridges x 20    manual therapy techniques: Joint mobilizations, Manual traction, Myofacial release, Manual  Lymphatic Drainage, Soft tissue Mobilization, and Friction Massage were applied to the: R piriformis for 14 minutes, including:  Massage with theragun along piriformis and iliotibial band   Manual hamstring, piriformis, quad, prone hip flexor, and hip external rotator stretches    neuromuscular re-education activities to improve: Balance, Coordination, Kinesthetic, Sense, Proprioception, and Posture for 8 minutes. The following activities were included:  Hooklying clam shells--Green band--3 x 10  Pelvic tilts 10 x 10 second hold     therapeutic activities to improve functional performance for 0  minutes, including:  -    Patient Education and Home Exercises       Education provided:   - review of home exercise program and current Plan of Care/rational of treatment.    Written Home Exercises Provided: Patient instructed to cont prior HEP. Exercises were reviewed and Chloe was able to demonstrate them prior to the end of the session.  Chloe demonstrated good understanding of the education provided. See EMR under Patient Instructions for exercises provided during therapy sessions    Assessment     At Evaluation:  Chloe is a 39 y.o. female referred to outpatient Physical Therapy with a medical diagnosis of chronic right SI joint pain. Patient presents with complaints of right buttock pain consistent with piriformis syndrome. The pain travels down the back of the thigh and into the lower leg. She does have some numbness and tingling in the right lower extremity as well    Current Assessment:  Chloe sat in the school bleachers after last treatment to watch an archery match. Her hip pain returned after sitting there a while. She received more manual stretching and theragun massage today to piriformis area. She reported feeling better after treatment.  Will progress as tolerated.     Chloe Is progressing towards her goals.   Pt prognosis is Good.     Pt will continue to benefit from skilled outpatient physical  therapy to address the deficits listed in the problem list box on initial evaluation, provide pt/family education and to maximize pt's level of independence in the home and community environment.     Pt's spiritual, cultural and educational needs considered and pt agreeable to plan of care and goals.     Anticipated barriers to physical therapy: none    Goals:   Patient will be independent with home exercise program to facilitate carryover between visits.  Patient will report decrease in pain to </= 2/10 at worst to improve quality of life.  Patient will report decrease in radiating occurrences from daily to no more than 3 times weekly to allow patient the ability to perform activities of daily living.   Patient will increase bilateral hip strength to 5/5 to improve ambulation ability.  5.   Patient will increase right hamstring 90/90 by 10 degrees for improved mobility.    Plan     Plan of care Certification: 2/21/2024 to 4/19/2024.     Outpatient Physical Therapy 2 times weekly for 8 weeks to include the following interventions: Cervical/Lumbar Traction, Manual Therapy, Moist Heat/ Ice, Neuromuscular Re-ed, Patient Education, Therapeutic Activities, and Therapeutic Exercise.     Dorcas Myrick, PTA   03/07/2024

## 2024-03-12 ENCOUNTER — CLINICAL SUPPORT (OUTPATIENT)
Dept: REHABILITATION | Facility: HOSPITAL | Age: 39
End: 2024-03-12
Payer: COMMERCIAL

## 2024-03-12 DIAGNOSIS — G57.01 PIRIFORMIS SYNDROME OF RIGHT SIDE: ICD-10-CM

## 2024-03-12 DIAGNOSIS — G89.29 CHRONIC RIGHT SI JOINT PAIN: Primary | ICD-10-CM

## 2024-03-12 DIAGNOSIS — M79.18 RIGHT BUTTOCK PAIN: ICD-10-CM

## 2024-03-12 DIAGNOSIS — M53.3 CHRONIC RIGHT SI JOINT PAIN: Primary | ICD-10-CM

## 2024-03-12 PROCEDURE — 97110 THERAPEUTIC EXERCISES: CPT | Mod: CQ

## 2024-03-12 PROCEDURE — 97112 NEUROMUSCULAR REEDUCATION: CPT | Mod: CQ

## 2024-03-12 PROCEDURE — 97140 MANUAL THERAPY 1/> REGIONS: CPT | Mod: CQ

## 2024-03-12 NOTE — PROGRESS NOTES
[facility]   Physical Therapy Treatment Note      Name: Chloe Nickerson  Clinic Number: [id number]    Therapy Diagnosis:   Encounter Diagnoses   Name Primary?    Chronic right SI joint pain Yes    Piriformis syndrome of right side     Right buttock pain      Physician: Zayda Young FNP    Visit Date: 3/12/2024    Physician Orders: PT Eval and Treat   Medical Diagnosis from Referral: see above  Evaluation Date: 2/21/2024  Authorization Period Expiration: n/a  Plan of Care Expiration: 4/19/2024     Date of Surgery: n/a  Visit # / Visits authorized: 6/ 20 then BMN   FOTO: 1/3 = 60     Precautions: Standard     PTA Visit #: 5/5     Time In: 4:47 pm  Time Out: 5:32 pm  Total Billable Time: 42 minutes    Subjective     Pt reports: she felt pretty bad on Sunday but is ok today.  She seems to feel worst in the evenings.   She was compliant with home exercise program.  Response to previous treatment: no complaints   Functional change: less pain    Pain: 1/10  Location: right buttocks      Objective      Objective Measures updated at progress report unless specified.     Treatment     Chloe received the treatments listed below:      therapeutic exercises to develop strength, endurance, ROM, flexibility, posture, and core stabilization for 10 minutes including:  Bike x 5 minutes   Seated piriformis stretch 3 x 30 seconds  (with manual)  standing hamstring stretch-3 x 30 seconds  (not performed today)   supine figure 4 piriformis stretch-3 x 30 seconds (with manual)  knee to opposite shoulder piriformis stretch--3 x 30 seconds (with manual)  sidelying ITB stretch-3 x 30 seconds (not performed today)   supine sciatic nerve glide- (not performed today)   Prone press ups--4 x 5   Prone hip ext-- (not performed today)   Prone on elbows x 3 minutes (not performed today)   Standing extension over mat--3 x 10 (not performed today)   Bridges with blue band x 20    manual therapy techniques:  Joint mobilizations, Manual traction, Myofacial release, Manual Lymphatic Drainage, Soft tissue Mobilization, and Friction Massage were applied to the: R piriformis for 24 minutes, including:  Massage with theragun along piriformis and iliotibial band   Manual hamstring, piriformis, quad, prone hip flexor, and hip external rotator stretches    neuromuscular re-education activities to improve: Balance, Coordination, Kinesthetic, Sense, Proprioception, and Posture for 8 minutes. The following activities were included:  Hooklying clam shells--blue band--3 x 10  Pelvic tilts 10 x 10 second hold     therapeutic activities to improve functional performance for 0  minutes, including:  -    Patient Education and Home Exercises       Education provided:   - review of home exercise program and current Plan of Care/rational of treatment.    Written Home Exercises Provided: Patient instructed to cont prior HEP. Exercises were reviewed and Chloe was able to demonstrate them prior to the end of the session.  Chloe demonstrated good understanding of the education provided. See EMR under Patient Instructions for exercises provided during therapy sessions    Assessment     At Evaluation:  Chloe is a 39 y.o. female referred to outpatient Physical Therapy with a medical diagnosis of chronic right SI joint pain. Patient presents with complaints of right buttock pain consistent with piriformis syndrome. The pain travels down the back of the thigh and into the lower leg. She does have some numbness and tingling in the right lower extremity as well    Current Assessment:  Chloe has not made significant progress over last week. Her pain continues to come and go but there is no clear activity that makes it worse. Treatment focused on stretching hip and improving core stability. Will progress as tolerated.     Chloe Is progressing towards her goals.   Pt prognosis is Good.     Pt will continue to benefit from skilled outpatient  physical therapy to address the deficits listed in the problem list box on initial evaluation, provide pt/family education and to maximize pt's level of independence in the home and community environment.     Pt's spiritual, cultural and educational needs considered and pt agreeable to plan of care and goals.     Anticipated barriers to physical therapy: none    Goals:   Patient will be independent with home exercise program to facilitate carryover between visits.  Patient will report decrease in pain to </= 2/10 at worst to improve quality of life.  Patient will report decrease in radiating occurrences from daily to no more than 3 times weekly to allow patient the ability to perform activities of daily living.   Patient will increase bilateral hip strength to 5/5 to improve ambulation ability.  5.   Patient will increase right hamstring 90/90 by 10 degrees for improved mobility.    Plan     Plan of care Certification: 2/21/2024 to 4/19/2024.     Outpatient Physical Therapy 2 times weekly for 8 weeks to include the following interventions: Cervical/Lumbar Traction, Manual Therapy, Moist Heat/ Ice, Neuromuscular Re-ed, Patient Education, Therapeutic Activities, and Therapeutic Exercise.     Dorcas Myrick, PTA   03/12/2024

## 2024-03-14 ENCOUNTER — CLINICAL SUPPORT (OUTPATIENT)
Dept: REHABILITATION | Facility: HOSPITAL | Age: 39
End: 2024-03-14
Payer: COMMERCIAL

## 2024-03-14 DIAGNOSIS — G57.01 PIRIFORMIS SYNDROME OF RIGHT SIDE: ICD-10-CM

## 2024-03-14 DIAGNOSIS — M53.3 CHRONIC RIGHT SI JOINT PAIN: Primary | ICD-10-CM

## 2024-03-14 DIAGNOSIS — G89.29 CHRONIC RIGHT SI JOINT PAIN: Primary | ICD-10-CM

## 2024-03-14 DIAGNOSIS — M79.18 RIGHT BUTTOCK PAIN: ICD-10-CM

## 2024-03-14 PROCEDURE — 97110 THERAPEUTIC EXERCISES: CPT | Mod: CQ

## 2024-03-14 NOTE — PROGRESS NOTES
[facility]   Physical Therapy Treatment Note      Name: Chloe Nickerson  Clinic Number: [id number]    Therapy Diagnosis:   Encounter Diagnoses   Name Primary?    Chronic right SI joint pain Yes    Piriformis syndrome of right side     Right buttock pain      Physician: Zayda Young FNP    Visit Date: 3/14/2024    Physician Orders: PT Eval and Treat   Medical Diagnosis from Referral: see above  Evaluation Date: 2/21/2024  Authorization Period Expiration: n/a  Plan of Care Expiration: 4/19/2024     Date of Surgery: n/a  Visit # / Visits authorized: 7/ 20 then BMN   FOTO: 1/3 = 60     Precautions: Standard     PTA Visit #: 0/5 Cotreatment with supervising PT     Time In: 4:47 pm  Time Out: 5:15 pm (treatment taken by PT at 5:15 pm)  Total Billable Time: 20 minutes    Subjective     Pt reports: she felt pretty bad on Sunday but is ok today.  She seems to feel worst in the evenings. She also says she is starting to have numbness in her heel now.  She was compliant with home exercise program.  Response to previous treatment: no complaints   Functional change: less pain    Pain: 1/10  Location: right buttocks      Objective      Objective Measures updated at progress report unless specified.     Treatment     Chloe received the treatments listed below:      therapeutic exercises to develop strength, endurance, ROM, flexibility, posture, and core stabilization for 20 minutes including:  Bike x 5 minutes   Slant board x 3 minutes   Seated trunk flexion stretch with ball 10 x 10 second hold   Cybex hip abduction with 1 plate x 20 bilateral   Cybex hip extension with 1 plate x 20 bilateral     manual therapy techniques: Joint mobilizations, Manual traction, Myofacial release, Manual Lymphatic Drainage, Soft tissue Mobilization, and Friction Massage were applied to the: R piriformis for 0 minutes, including:  (not performed today)   Massage with theragun along piriformis and iliotibial  band   Manual hamstring, piriformis, quad, prone hip flexor, and hip external rotator stretches    neuromuscular re-education activities to improve: Balance, Coordination, Kinesthetic, Sense, Proprioception, and Posture for 0 minutes. The following activities were included:  (not performed today)   Hooklying clam shells--blue band--3 x 10  Pelvic tilts 10 x 10 second hold     therapeutic activities to improve functional performance for 0  minutes, including:  -    supervised modalities after being cleared for contradictions: Mechanical Traction:  Chloe received intermittent mechanical traction to the lumbar spine at a force of 50 pounds for a total of 15 minutes. Hold time of 60 seconds and rest time for 20  seconds. Patient tolerated treatment well without any adverse effects. (Set up by PT - not charged by PTA)      Patient Education and Home Exercises       Education provided:   - review of home exercise program and current Plan of Care/rational of treatment.    Written Home Exercises Provided: Patient instructed to cont prior HEP. Exercises were reviewed and Chloe was able to demonstrate them prior to the end of the session.  Chloe demonstrated good understanding of the education provided. See EMR under Patient Instructions for exercises provided during therapy sessions    Assessment     At Evaluation:  Chloe is a 39 y.o. female referred to outpatient Physical Therapy with a medical diagnosis of chronic right SI joint pain. Patient presents with complaints of right buttock pain consistent with piriformis syndrome. The pain travels down the back of the thigh and into the lower leg. She does have some numbness and tingling in the right lower extremity as well    Current Assessment:  Chloe reports the pain was so bad in her ankle last night that she wanted to amputate her foot. Discussed treatment plan and agreed rescheduling her MRI would be wise. She performed some exercises in gym and then received  mechanical lumbar traction per PT to address radicular symptoms.  Will monitor response and progress as tolerated.     Chloe Is progressing towards her goals.   Pt prognosis is Good.     Pt will continue to benefit from skilled outpatient physical therapy to address the deficits listed in the problem list box on initial evaluation, provide pt/family education and to maximize pt's level of independence in the home and community environment.     Pt's spiritual, cultural and educational needs considered and pt agreeable to plan of care and goals.     Anticipated barriers to physical therapy: none    Goals:   Patient will be independent with home exercise program to facilitate carryover between visits. MET  Patient will report decrease in pain to </= 2/10 at worst to improve quality of life. NOT MET 10/10 at worst - worst in evening, 0/10 at best - usually when she first wakes up  Patient will report decrease in radiating occurrences from daily to no more than 3 times weekly to allow patient the ability to perform activities of daily living. NOT MET - still at least 3 episodes a day  Patient will increase bilateral hip strength to 5/5 to improve ambulation ability.  5.   Patient will increase right hamstring 90/90 by 10 degrees for improved mobility.    Plan     Plan of care Certification: 2/21/2024 to 4/19/2024.     Outpatient Physical Therapy 2 times weekly for 8 weeks to include the following interventions: Cervical/Lumbar Traction, Manual Therapy, Moist Heat/ Ice, Neuromuscular Re-ed, Patient Education, Therapeutic Activities, and Therapeutic Exercise.     Dorcas Myrick, PTA   03/14/2024

## 2024-03-15 ENCOUNTER — PATIENT MESSAGE (OUTPATIENT)
Dept: FAMILY MEDICINE | Facility: CLINIC | Age: 39
End: 2024-03-15
Payer: COMMERCIAL

## 2024-03-18 DIAGNOSIS — M54.31 SCIATICA OF RIGHT SIDE: ICD-10-CM

## 2024-03-18 DIAGNOSIS — M54.9 DORSALGIA, UNSPECIFIED: Primary | ICD-10-CM

## 2024-03-19 ENCOUNTER — CLINICAL SUPPORT (OUTPATIENT)
Dept: REHABILITATION | Facility: HOSPITAL | Age: 39
End: 2024-03-19
Payer: COMMERCIAL

## 2024-03-19 DIAGNOSIS — M53.3 CHRONIC RIGHT SI JOINT PAIN: Primary | ICD-10-CM

## 2024-03-19 DIAGNOSIS — G57.01 PIRIFORMIS SYNDROME OF RIGHT SIDE: ICD-10-CM

## 2024-03-19 DIAGNOSIS — G89.29 CHRONIC RIGHT SI JOINT PAIN: Primary | ICD-10-CM

## 2024-03-19 DIAGNOSIS — M79.18 RIGHT BUTTOCK PAIN: ICD-10-CM

## 2024-03-19 NOTE — PROGRESS NOTES
[facility]   Physical Therapy Treatment Note      Name: Chloe Nickerson  Clinic Number: [id number]    Therapy Diagnosis:   Encounter Diagnoses   Name Primary?    Chronic right SI joint pain Yes    Piriformis syndrome of right side     Right buttock pain      Physician: Zayda Young FNP    Visit Date: 3/19/2024    Physician Orders: PT Eval and Treat   Medical Diagnosis from Referral: see above  Evaluation Date: 2/21/2024  Authorization Period Expiration: n/a  Plan of Care Expiration: 4/19/2024     Date of Surgery: n/a  Visit # / Visits authorized: 8/ 20 then BMN   FOTO: 1/3 = 60     Precautions: Standard     PTA Visit #: 0/5      Time In: 4:45 pm  Time Out: 4:55 pm   Total Billable Time: 10 minutes non billable - discussed holding physical therapy until MRI    Subjective     Pt reports: she says she has good days and bad days but the bad days are more than what she wants to have to deal with - says she is scheduled for an MRI on 4/8/24.  She was compliant with home exercise program.  Response to previous treatment: no complaints   Functional change: less pain    Pain: 1/10  Location: right buttocks      Objective      Objective Measures updated at progress report unless specified.     Treatment     Chloe received the treatments listed below:      therapeutic exercises to develop strength, endurance, ROM, flexibility, posture, and core stabilization for 0 minutes including:  Bike x 5 minutes   Slant board x 3 minutes   Seated trunk flexion stretch with ball 10 x 10 second hold   Cybex hip abduction with 1 plate x 20 bilateral   Cybex hip extension with 1 plate x 20 bilateral     manual therapy techniques: Joint mobilizations, Manual traction, Myofacial release, Manual Lymphatic Drainage, Soft tissue Mobilization, and Friction Massage were applied to the: R piriformis for 0 minutes, including:  (not performed today)   Massage with theragun along piriformis and iliotibial band    Manual hamstring, piriformis, quad, prone hip flexor, and hip external rotator stretches    neuromuscular re-education activities to improve: Balance, Coordination, Kinesthetic, Sense, Proprioception, and Posture for 0 minutes. The following activities were included:  (not performed today)   Hooklying clam shells--blue band--3 x 10  Pelvic tilts 10 x 10 second hold     therapeutic activities to improve functional performance for 0  minutes, including:  -    supervised modalities after being cleared for contradictions: Mechanical Traction:  Chloe received intermittent mechanical traction to the lumbar spine at a force of 50 pounds for a total of 0 minutes. Hold time of 60 seconds and rest time for 20  seconds. Patient tolerated treatment well without any adverse effects.       Patient Education and Home Exercises       Education provided:   - review of home exercise program and current Plan of Care/rational of treatment.    Written Home Exercises Provided: Patient instructed to cont prior HEP. Exercises were reviewed and Chloe was able to demonstrate them prior to the end of the session.  Chloe demonstrated good understanding of the education provided. See EMR under Patient Instructions for exercises provided during therapy sessions    Assessment     At Evaluation:  Chloe is a 39 y.o. female referred to outpatient Physical Therapy with a medical diagnosis of chronic right SI joint pain. Patient presents with complaints of right buttock pain consistent with piriformis syndrome. The pain travels down the back of the thigh and into the lower leg. She does have some numbness and tingling in the right lower extremity as well    Current Assessment:  Chloe reports she got her MRI scheduled for 4/8/24 and would like to hold physical therapy until that time. Will follow up with her afterwards and decide how to proceed. She is concerned she might need surgery again. No charge for today as no treatment was  performed.     Chloe Is progressing towards her goals.   Pt prognosis is Good.     Pt will continue to benefit from skilled outpatient physical therapy to address the deficits listed in the problem list box on initial evaluation, provide pt/family education and to maximize pt's level of independence in the home and community environment.     Pt's spiritual, cultural and educational needs considered and pt agreeable to plan of care and goals.     Anticipated barriers to physical therapy: none    Goals:   Patient will be independent with home exercise program to facilitate carryover between visits. MET  Patient will report decrease in pain to </= 2/10 at worst to improve quality of life. NOT MET 10/10 at worst - worst in evening, 0/10 at best - usually when she first wakes up  Patient will report decrease in radiating occurrences from daily to no more than 3 times weekly to allow patient the ability to perform activities of daily living. NOT MET - still at least 3 episodes a day  Patient will increase bilateral hip strength to 5/5 to improve ambulation ability.  5.   Patient will increase right hamstring 90/90 by 10 degrees for improved mobility.    Plan     HOLD physical therapy until after MRI on 4/8/2024.    Plan of care Certification: 2/21/2024 to 4/19/2024.     Outpatient Physical Therapy 2 times weekly for 8 weeks to include the following interventions: Cervical/Lumbar Traction, Manual Therapy, Moist Heat/ Ice, Neuromuscular Re-ed, Patient Education, Therapeutic Activities, and Therapeutic Exercise.     JEFF JESSICA, PT   03/19/2024

## 2024-04-10 ENCOUNTER — TELEPHONE (OUTPATIENT)
Dept: FAMILY MEDICINE | Facility: CLINIC | Age: 39
End: 2024-04-10
Payer: COMMERCIAL

## 2024-04-10 DIAGNOSIS — M51.36 BULGING LUMBAR DISC: ICD-10-CM

## 2024-04-10 DIAGNOSIS — M54.31 SCIATICA OF RIGHT SIDE: Primary | ICD-10-CM

## 2025-03-26 ENCOUNTER — PATIENT MESSAGE (OUTPATIENT)
Dept: FAMILY MEDICINE | Facility: CLINIC | Age: 40
End: 2025-03-26
Payer: COMMERCIAL

## 2025-03-26 ENCOUNTER — OFFICE VISIT (OUTPATIENT)
Dept: FAMILY MEDICINE | Facility: CLINIC | Age: 40
End: 2025-03-26
Payer: COMMERCIAL

## 2025-03-26 VITALS
BODY MASS INDEX: 42.68 KG/M2 | HEIGHT: 64 IN | HEART RATE: 71 BPM | RESPIRATION RATE: 20 BRPM | SYSTOLIC BLOOD PRESSURE: 112 MMHG | OXYGEN SATURATION: 98 % | TEMPERATURE: 99 F | DIASTOLIC BLOOD PRESSURE: 77 MMHG | WEIGHT: 250 LBS

## 2025-03-26 DIAGNOSIS — R35.0 FREQUENT URINATION: ICD-10-CM

## 2025-03-26 DIAGNOSIS — N30.00 ACUTE CYSTITIS WITHOUT HEMATURIA: Primary | ICD-10-CM

## 2025-03-26 LAB
BILIRUB SERPL-MCNC: NEGATIVE MG/DL
BLOOD URINE, POC: ABNORMAL
CLARITY, UA: CLEAR
COLOR, UA: YELLOW
GLUCOSE UR QL STRIP: NEGATIVE
KETONES UR QL STRIP: NEGATIVE
LEUKOCYTE ESTERASE URINE, POC: ABNORMAL
NITRITE, POC UA: NEGATIVE
PH, POC UA: 8.5
PROTEIN, POC: ABNORMAL
SPECIFIC GRAVITY, POC UA: 1.01
UROBILINOGEN, POC UA: 0.2

## 2025-03-26 PROCEDURE — 1160F RVW MEDS BY RX/DR IN RCRD: CPT | Mod: CPTII,,, | Performed by: FAMILY MEDICINE

## 2025-03-26 PROCEDURE — 87086 URINE CULTURE/COLONY COUNT: CPT | Mod: ,,, | Performed by: CLINICAL MEDICAL LABORATORY

## 2025-03-26 PROCEDURE — 99214 OFFICE O/P EST MOD 30 MIN: CPT | Mod: ,,, | Performed by: FAMILY MEDICINE

## 2025-03-26 PROCEDURE — 1159F MED LIST DOCD IN RCRD: CPT | Mod: CPTII,,, | Performed by: FAMILY MEDICINE

## 2025-03-26 PROCEDURE — 87077 CULTURE AEROBIC IDENTIFY: CPT | Mod: ,,, | Performed by: CLINICAL MEDICAL LABORATORY

## 2025-03-26 PROCEDURE — 3008F BODY MASS INDEX DOCD: CPT | Mod: CPTII,,, | Performed by: FAMILY MEDICINE

## 2025-03-26 PROCEDURE — 3078F DIAST BP <80 MM HG: CPT | Mod: CPTII,,, | Performed by: FAMILY MEDICINE

## 2025-03-26 PROCEDURE — 3074F SYST BP LT 130 MM HG: CPT | Mod: CPTII,,, | Performed by: FAMILY MEDICINE

## 2025-03-26 RX ORDER — CIPROFLOXACIN 500 MG/1
500 TABLET ORAL EVERY 12 HOURS
Qty: 14 TABLET | Refills: 0 | Status: SHIPPED | OUTPATIENT
Start: 2025-03-26 | End: 2025-04-02

## 2025-03-26 NOTE — PROGRESS NOTES
Subjective:       Patient ID: Chloe Nickerson is a 40 y.o. female.    Chief Complaint: Urinary Tract Infection    Urinary Tract Infection   Associated symptoms include frequency and urgency. Pertinent negatives include no chills, flank pain, hematuria, nausea, vomiting, constipation or rash.     Review of Systems   Constitutional:  Negative for activity change, appetite change, chills, diaphoresis, fatigue, fever and unexpected weight change.   HENT:  Negative for nasal congestion, dental problem, drooling, ear discharge, ear pain, facial swelling, hearing loss, mouth sores, nosebleeds, postnasal drip, rhinorrhea, sinus pressure/congestion, sneezing, sore throat, tinnitus, trouble swallowing, voice change and goiter.    Eyes:  Negative for photophobia, discharge, itching and visual disturbance.   Respiratory:  Negative for apnea, cough, choking, chest tightness, shortness of breath, wheezing and stridor.    Cardiovascular:  Negative for chest pain, palpitations, leg swelling and claudication.   Gastrointestinal:  Negative for abdominal distention, abdominal pain, anal bleeding, blood in stool, change in bowel habit, constipation, diarrhea, nausea and vomiting.   Endocrine: Negative for cold intolerance, heat intolerance, polydipsia, polyphagia and polyuria.   Genitourinary:  Positive for dysuria, frequency and urgency. Negative for bladder incontinence, decreased urine volume, difficulty urinating, enuresis, flank pain, hematuria, nocturia and pelvic pain.   Musculoskeletal:  Negative for arthralgias, back pain, gait problem, joint swelling, leg pain, myalgias, neck pain, neck stiffness and joint deformity.   Integumentary:  Negative for pallor, rash, wound, breast mass and breast tenderness.   Allergic/Immunologic: Negative for environmental allergies, food allergies and immunocompromised state.   Neurological:  Negative for dizziness, vertigo, tremors, seizures, syncope, facial asymmetry, speech difficulty,  weakness, light-headedness, numbness, headaches, memory loss and coordination difficulties.   Hematological:  Negative for adenopathy. Does not bruise/bleed easily.   Psychiatric/Behavioral:  Negative for agitation, behavioral problems, confusion, decreased concentration, dysphoric mood, hallucinations, self-injury, sleep disturbance and suicidal ideas. The patient is not nervous/anxious and is not hyperactive.    Breast: Negative for mass and tenderness        Objective:      Physical Exam  Vitals reviewed.   Constitutional:       Appearance: Normal appearance.   HENT:      Head: Normocephalic and atraumatic.      Right Ear: Tympanic membrane, ear canal and external ear normal.      Left Ear: Tympanic membrane, ear canal and external ear normal.      Nose: Nose normal.      Mouth/Throat:      Mouth: Mucous membranes are moist.      Pharynx: Oropharynx is clear.   Eyes:      Extraocular Movements: Extraocular movements intact.      Conjunctiva/sclera: Conjunctivae normal.      Pupils: Pupils are equal, round, and reactive to light.   Cardiovascular:      Rate and Rhythm: Normal rate and regular rhythm.      Pulses: Normal pulses.      Heart sounds: Normal heart sounds.   Pulmonary:      Effort: Pulmonary effort is normal.      Breath sounds: Normal breath sounds.   Abdominal:      General: Bowel sounds are normal.      Palpations: Abdomen is soft.   Musculoskeletal:         General: Normal range of motion.      Cervical back: Normal range of motion and neck supple.   Skin:     General: Skin is warm and dry.   Neurological:      General: No focal deficit present.      Mental Status: She is alert. Mental status is at baseline.   Psychiatric:         Mood and Affect: Mood normal.         Behavior: Behavior normal.         Thought Content: Thought content normal.         Judgment: Judgment normal.         Assessment:       1. Acute cystitis without hematuria    2. Frequent urination        Plan:     Acute cystitis  without hematuria  -     Urine Culture High Risk; Future; Expected date: 03/26/2025    Frequent urination  -     POCT URINALYSIS W/O SCOPE    Other orders  -     ciprofloxacin HCl (CIPRO) 500 MG tablet; Take 1 tablet (500 mg total) by mouth every 12 (twelve) hours. for 7 days  Dispense: 14 tablet; Refill: 0

## 2025-03-28 LAB — UA COMPLETE W REFLEX CULTURE PNL UR: ABNORMAL

## 2025-03-28 RX ORDER — PHENAZOPYRIDINE HYDROCHLORIDE 100 MG/1
100 TABLET, FILM COATED ORAL 3 TIMES DAILY PRN
Qty: 20 TABLET | Refills: 0 | Status: SHIPPED | OUTPATIENT
Start: 2025-03-28

## 2025-06-02 ENCOUNTER — HOSPITAL ENCOUNTER (OUTPATIENT)
Dept: CARDIOLOGY | Facility: HOSPITAL | Age: 40
Discharge: HOME | End: 2025-06-02
Attending: FAMILY MEDICINE
Payer: COMMERCIAL

## 2025-06-02 DIAGNOSIS — Z01.818 PREOPERATIVE CLEARANCE: Primary | ICD-10-CM

## 2025-06-02 LAB
OHS QRS DURATION: 78 MS
OHS QTC CALCULATION: 423 MS

## 2025-06-02 PROCEDURE — 93005 ELECTROCARDIOGRAM TRACING: CPT

## 2025-07-02 ENCOUNTER — OFFICE VISIT (OUTPATIENT)
Dept: FAMILY MEDICINE | Facility: CLINIC | Age: 40
End: 2025-07-02
Payer: COMMERCIAL

## 2025-07-02 VITALS
RESPIRATION RATE: 16 BRPM | HEIGHT: 64 IN | TEMPERATURE: 99 F | BODY MASS INDEX: 40.83 KG/M2 | HEART RATE: 66 BPM | SYSTOLIC BLOOD PRESSURE: 130 MMHG | OXYGEN SATURATION: 97 % | WEIGHT: 239.19 LBS | DIASTOLIC BLOOD PRESSURE: 78 MMHG

## 2025-07-02 DIAGNOSIS — E66.01 MORBID (SEVERE) OBESITY DUE TO EXCESS CALORIES: ICD-10-CM

## 2025-07-02 DIAGNOSIS — C53.1 MALIGNANT NEOPLASM OF EXOCERVIX: ICD-10-CM

## 2025-07-02 DIAGNOSIS — E66.01 CLASS 3 SEVERE OBESITY DUE TO EXCESS CALORIES WITH SERIOUS COMORBIDITY AND BODY MASS INDEX (BMI) OF 40.0 TO 44.9 IN ADULT: Primary | Chronic | ICD-10-CM

## 2025-07-02 DIAGNOSIS — E66.813 CLASS 3 SEVERE OBESITY DUE TO EXCESS CALORIES WITH SERIOUS COMORBIDITY AND BODY MASS INDEX (BMI) OF 40.0 TO 44.9 IN ADULT: Primary | Chronic | ICD-10-CM

## 2025-07-02 PROCEDURE — 3078F DIAST BP <80 MM HG: CPT | Mod: CPTII,,, | Performed by: FAMILY MEDICINE

## 2025-07-02 PROCEDURE — 99213 OFFICE O/P EST LOW 20 MIN: CPT | Mod: ,,, | Performed by: FAMILY MEDICINE

## 2025-07-02 PROCEDURE — 3008F BODY MASS INDEX DOCD: CPT | Mod: CPTII,,, | Performed by: FAMILY MEDICINE

## 2025-07-02 PROCEDURE — 1159F MED LIST DOCD IN RCRD: CPT | Mod: CPTII,,, | Performed by: FAMILY MEDICINE

## 2025-07-02 PROCEDURE — 3075F SYST BP GE 130 - 139MM HG: CPT | Mod: CPTII,,, | Performed by: FAMILY MEDICINE

## 2025-07-02 RX ORDER — TIRZEPATIDE 2.5 MG/.5ML
2.5 INJECTION, SOLUTION SUBCUTANEOUS
Qty: 4 PEN | Refills: 0 | Status: SHIPPED | OUTPATIENT
Start: 2025-07-02

## 2025-07-02 NOTE — PROGRESS NOTES
"Subjective     Patient ID: Chloe Nickerson is a 40 y.o. female.    Chief Complaint: Annual Exam (Yearly check up,discuss weight.)    History of Present Illness    CHIEF COMPLAINT:  Patient presents today for follow up after laminectomy on L4    POST-SURGICAL STATUS:  She underwent laminectomy at [facility] by . . She reports weight loss of approximately 10.5 lbs since surgery, associated with reduced appetite and post-surgical changes.    WEIGHT MANAGEMENT HISTORY:  She has significant history of weight loss, previously reducing from 265 to 173 lbs over one year and maintaining it for some time. She used Ozempic previously for weight management without issues. She reports weight gain over the past year primarily due to decreased physical activity. Her previously active lifestyle included walking and kayaking, but she is currently predominantly sedentary, limiting activity to work-related movements. She has not used her max  or participated in structured weight management programs.    DIET:  She reports a generally good diet consisting of sandwiches and denies significant sweet tooth. She maintains adequate hydration by drinking water throughout the day.    FAMILY HISTORY:  Mother had thyroid issues, suggesting potential genetic predisposition to thyroid disorders.      ROS:  Constitutional: +diminished activity  Musculoskeletal: +joint pain, +back pain, +limb pain              Objective   Blood pressure 130/78, pulse 66, temperature 98.9 °F (37.2 °C), temperature source Oral, resp. rate 16, height 5' 4" (1.626 m), weight 108.5 kg (239 lb 3.2 oz), SpO2 97%.    Physical Exam  Constitutional:       Appearance: Normal appearance.   HENT:      Head: Normocephalic and atraumatic.      Nose: Nose normal.      Mouth/Throat:      Mouth: Mucous membranes are moist.   Eyes:      Pupils: Pupils are equal, round, and reactive to light.   Cardiovascular:      Rate and Rhythm: Normal rate and regular " rhythm.      Pulses: Normal pulses.      Heart sounds: Normal heart sounds.   Pulmonary:      Effort: Pulmonary effort is normal.      Breath sounds: Normal breath sounds.   Abdominal:      General: Abdomen is flat.      Palpations: Abdomen is soft.   Skin:     General: Skin is warm and dry.   Neurological:      General: No focal deficit present.      Mental Status: She is alert and oriented to person, place, and time.   Psychiatric:         Mood and Affect: Mood normal.         Behavior: Behavior normal.         Thought Content: Thought content normal.         Judgment: Judgment normal.            Assessment and Plan   Assessment & Plan    R63.4 Abnormal weight loss  E66.3 Overweight  Z83.49 Family history of other endocrine, nutritional and metabolic diseases    ## WEIGHT LOSS AND MANAGEMENT:  - Noted the patient has lost 6-10.5 lbs since surgery, but has experienced recent weight gain due to decreased activity level post-back surgery.  - Patient has history of successful weight loss using Ozempic.  - Prescribed Zepbound (tirzepatide) for weight loss, which may be more effective than Ozempic due to its dual mechanism of action affecting both stomach and fat cells.  - Initial prescription for 4 pens (1 month supply) with no refills, beginning with 2.5 mg dose, with potential to increase based on tolerance and efficacy.  - Instructed patient to use one pen per dose with no manual dosing required.  - Advised to contact the office when ready for medication refill or dose adjustment.  - Discussed potential insurance coverage limitations and cost considerations for weight loss medications.  - Recommend Weight Watchers for additional structured weight management support.  - Educated patient on the long-term nature of weight loss efforts, emphasizing consistent tracking, accountability, portion control, and mindful eating habits.  - Discussed the importance of lifestyle changes in conjunction with medication, including  the caloric impact of exercise versus dietary changes.  - Recommend increasing physical activity as tolerated, given recent back surgery.    ## OVERWEIGHT:  - Documented patient's weight fluctuation: previously 265 lbs, decreased to 173 lbs, and has increased again significantly over the past year due to decreased activity following back surgery.    ## FAMILY HISTORY OF ENDOCRINE DISORDERS:  - Noted patient's mother had thyroid issues.  - Documented A1C of 5.1 in 2022 indicates no diabetes or prediabetes.    ## FOLLOW-UP:  - Scheduled follow-up to assess progress on weight loss and medication efficacy.         1. Class 3 severe obesity due to excess calories with serious comorbidity and body mass index (BMI) of 40.0 to 44.9 in adult  -     tirzepatide, weight loss, (ZEPBOUND) 2.5 mg/0.5 mL PnIj; Inject 2.5 mg into the skin every 7 days.  Dispense: 4 Pen; Refill: 0    2. Morbid (severe) obesity due to excess calories  -     tirzepatide, weight loss, (ZEPBOUND) 2.5 mg/0.5 mL PnIj; Inject 2.5 mg into the skin every 7 days.  Dispense: 4 Pen; Refill: 0    3. Malignant neoplasm of exocervix    Encouraged her to join Weight Watchers program online for calorie counting and lifestyle changes.     This note was generated with the assistance of ambient listening technology. Verbal consent was obtained by the patient and accompanying visitor(s) for the recording of patient appointment to facilitate this note. I attest to having reviewed and edited the generated note for accuracy, though some syntax or spelling errors may persist. Please contact the author of this note for any clarification.           Follow up if symptoms worsen or fail to improve.

## 2025-07-06 ENCOUNTER — PATIENT MESSAGE (OUTPATIENT)
Dept: ADMINISTRATIVE | Facility: HOSPITAL | Age: 40
End: 2025-07-06

## 2025-07-17 ENCOUNTER — PATIENT MESSAGE (OUTPATIENT)
Dept: FAMILY MEDICINE | Facility: CLINIC | Age: 40
End: 2025-07-17
Payer: COMMERCIAL